# Patient Record
Sex: FEMALE | Race: WHITE | Employment: FULL TIME | URBAN - NONMETROPOLITAN AREA
[De-identification: names, ages, dates, MRNs, and addresses within clinical notes are randomized per-mention and may not be internally consistent; named-entity substitution may affect disease eponyms.]

---

## 2017-07-26 ENCOUNTER — HOSPITAL ENCOUNTER (OUTPATIENT)
Dept: GENERAL RADIOLOGY | Age: 57
Discharge: HOME | End: 2017-07-26
Payer: COMMERCIAL

## 2017-07-26 ENCOUNTER — HOSPITAL ENCOUNTER (OUTPATIENT)
Age: 57
Discharge: HOME | End: 2017-07-26
Payer: COMMERCIAL

## 2017-07-26 DIAGNOSIS — R05.9 COUGH: ICD-10-CM

## 2017-07-26 PROCEDURE — 71020 XR CHEST STANDARD TWO VW: CPT

## 2017-12-03 ENCOUNTER — HOSPITAL ENCOUNTER (OUTPATIENT)
Age: 57
Setting detail: OBSERVATION
LOS: 1 days | Discharge: HOME | End: 2017-12-04
Attending: EMERGENCY MEDICINE | Admitting: FAMILY MEDICINE
Payer: COMMERCIAL

## 2017-12-03 ENCOUNTER — APPOINTMENT (OUTPATIENT)
Dept: GENERAL RADIOLOGY | Age: 57
End: 2017-12-03
Payer: COMMERCIAL

## 2017-12-03 DIAGNOSIS — I20.8 STABLE ANGINA PECTORIS (HCC): Primary | ICD-10-CM

## 2017-12-03 DIAGNOSIS — J40 BRONCHITIS: ICD-10-CM

## 2017-12-03 PROBLEM — R07.9 CHEST PAIN: Status: ACTIVE | Noted: 2017-12-03

## 2017-12-03 PROBLEM — J45.21 MILD INTERMITTENT ASTHMA WITH ACUTE EXACERBATION: Chronic | Status: ACTIVE | Noted: 2017-12-03

## 2017-12-03 LAB
ABSOLUTE EOS #: 0.2 K/UL (ref 0–0.4)
ABSOLUTE IMMATURE GRANULOCYTE: ABNORMAL K/UL (ref 0–0.3)
ABSOLUTE LYMPH #: 2.4 K/UL (ref 1–4.8)
ABSOLUTE MONO #: 0.7 K/UL (ref 0–1)
ANION GAP SERPL CALCULATED.3IONS-SCNC: 12 MMOL/L (ref 9–17)
BASOPHILS # BLD: 1 % (ref 0–2)
BASOPHILS ABSOLUTE: 0.1 K/UL (ref 0–0.2)
BNP INTERPRETATION: NORMAL
BUN BLDV-MCNC: 16 MG/DL (ref 6–20)
BUN/CREAT BLD: 20 (ref 9–20)
CALCIUM SERPL-MCNC: 9.2 MG/DL (ref 8.6–10.4)
CHLORIDE BLD-SCNC: 100 MMOL/L (ref 98–107)
CO2: 29 MMOL/L (ref 20–31)
CREAT SERPL-MCNC: 0.8 MG/DL (ref 0.5–0.9)
D-DIMER QUANTITATIVE: 0.25 MG/L FEU (ref 0.19–0.5)
DIFFERENTIAL TYPE: ABNORMAL
EKG ATRIAL RATE: 100 BPM
EKG ATRIAL RATE: 107 BPM
EKG ATRIAL RATE: 77 BPM
EKG ATRIAL RATE: 86 BPM
EKG P AXIS: 40 DEGREES
EKG P AXIS: 49 DEGREES
EKG P AXIS: 53 DEGREES
EKG P AXIS: 58 DEGREES
EKG P-R INTERVAL: 148 MS
EKG P-R INTERVAL: 150 MS
EKG Q-T INTERVAL: 340 MS
EKG Q-T INTERVAL: 342 MS
EKG Q-T INTERVAL: 364 MS
EKG Q-T INTERVAL: 394 MS
EKG QRS DURATION: 72 MS
EKG QRS DURATION: 74 MS
EKG QTC CALCULATION (BAZETT): 435 MS
EKG QTC CALCULATION (BAZETT): 441 MS
EKG QTC CALCULATION (BAZETT): 445 MS
EKG QTC CALCULATION (BAZETT): 453 MS
EKG R AXIS: -3 DEGREES
EKG R AXIS: 23 DEGREES
EKG R AXIS: 37 DEGREES
EKG R AXIS: 41 DEGREES
EKG T AXIS: 43 DEGREES
EKG T AXIS: 57 DEGREES
EKG T AXIS: 61 DEGREES
EKG T AXIS: 61 DEGREES
EKG VENTRICULAR RATE: 100 BPM
EKG VENTRICULAR RATE: 107 BPM
EKG VENTRICULAR RATE: 77 BPM
EKG VENTRICULAR RATE: 86 BPM
EOSINOPHILS RELATIVE PERCENT: 3 % (ref 0–8)
GFR AFRICAN AMERICAN: >60 ML/MIN
GFR NON-AFRICAN AMERICAN: >60 ML/MIN
GFR SERPL CREATININE-BSD FRML MDRD: NORMAL ML/MIN/{1.73_M2}
GFR SERPL CREATININE-BSD FRML MDRD: NORMAL ML/MIN/{1.73_M2}
GLUCOSE BLD-MCNC: 95 MG/DL (ref 70–99)
HCT VFR BLD CALC: 37.8 % (ref 36–46)
HEMOGLOBIN: 12.2 G/DL (ref 12–16)
IMMATURE GRANULOCYTES: ABNORMAL %
INR BLD: 1 (ref 0.9–1.2)
LYMPHOCYTES # BLD: 32 % (ref 24–44)
MCH RBC QN AUTO: 26.4 PG (ref 26–34)
MCHC RBC AUTO-ENTMCNC: 32.3 G/DL (ref 31–37)
MCV RBC AUTO: 81.7 FL (ref 80–100)
MONOCYTES # BLD: 10 % (ref 0–12)
PARTIAL THROMBOPLASTIN TIME: 24.3 SEC (ref 23.2–34.4)
PDW BLD-RTO: 16 % (ref 12.1–15.2)
PLATELET # BLD: 351 K/UL (ref 140–450)
PLATELET ESTIMATE: ABNORMAL
PMV BLD AUTO: 6.7 FL (ref 6–12)
POTASSIUM SERPL-SCNC: 3.8 MMOL/L (ref 3.7–5.3)
PRO-BNP: 30 PG/ML
PROTHROMBIN TIME: 10.6 SEC (ref 9.7–12.2)
RBC # BLD: 4.62 M/UL (ref 4–5.2)
RBC # BLD: ABNORMAL 10*6/UL
SEG NEUTROPHILS: 54 % (ref 36–66)
SEGMENTED NEUTROPHILS ABSOLUTE COUNT: 4.1 K/UL (ref 1.8–7.7)
SODIUM BLD-SCNC: 141 MMOL/L (ref 135–144)
TROPONIN INTERP: NORMAL
TROPONIN INTERP: NORMAL
TROPONIN T: <0.03 NG/ML
TROPONIN T: <0.03 NG/ML
WBC # BLD: 7.4 K/UL (ref 3.5–11)
WBC # BLD: ABNORMAL 10*3/UL

## 2017-12-03 PROCEDURE — 94664 DEMO&/EVAL PT USE INHALER: CPT

## 2017-12-03 PROCEDURE — 80048 BASIC METABOLIC PNL TOTAL CA: CPT

## 2017-12-03 PROCEDURE — 1200000000 HC SEMI PRIVATE

## 2017-12-03 PROCEDURE — 84484 ASSAY OF TROPONIN QUANT: CPT

## 2017-12-03 PROCEDURE — 6370000000 HC RX 637 (ALT 250 FOR IP): Performed by: FAMILY MEDICINE

## 2017-12-03 PROCEDURE — 6370000000 HC RX 637 (ALT 250 FOR IP): Performed by: EMERGENCY MEDICINE

## 2017-12-03 PROCEDURE — 85610 PROTHROMBIN TIME: CPT

## 2017-12-03 PROCEDURE — 71010 XR CHEST PORTABLE: CPT

## 2017-12-03 PROCEDURE — 83880 ASSAY OF NATRIURETIC PEPTIDE: CPT

## 2017-12-03 PROCEDURE — 85379 FIBRIN DEGRADATION QUANT: CPT

## 2017-12-03 PROCEDURE — 96372 THER/PROPH/DIAG INJ SC/IM: CPT

## 2017-12-03 PROCEDURE — 2580000003 HC RX 258: Performed by: FAMILY MEDICINE

## 2017-12-03 PROCEDURE — 93005 ELECTROCARDIOGRAM TRACING: CPT

## 2017-12-03 PROCEDURE — 96374 THER/PROPH/DIAG INJ IV PUSH: CPT

## 2017-12-03 PROCEDURE — 6360000002 HC RX W HCPCS: Performed by: FAMILY MEDICINE

## 2017-12-03 PROCEDURE — 6360000002 HC RX W HCPCS: Performed by: EMERGENCY MEDICINE

## 2017-12-03 PROCEDURE — G0378 HOSPITAL OBSERVATION PER HR: HCPCS

## 2017-12-03 PROCEDURE — 85730 THROMBOPLASTIN TIME PARTIAL: CPT

## 2017-12-03 PROCEDURE — 99285 EMERGENCY DEPT VISIT HI MDM: CPT

## 2017-12-03 PROCEDURE — 85025 COMPLETE CBC W/AUTO DIFF WBC: CPT

## 2017-12-03 RX ORDER — SODIUM CHLORIDE 0.9 % (FLUSH) 0.9 %
10 SYRINGE (ML) INJECTION PRN
Status: DISCONTINUED | OUTPATIENT
Start: 2017-12-03 | End: 2017-12-04 | Stop reason: HOSPADM

## 2017-12-03 RX ORDER — AZITHROMYCIN 250 MG/1
250 TABLET, FILM COATED ORAL DAILY
COMMUNITY
End: 2019-05-02

## 2017-12-03 RX ORDER — ASPIRIN 81 MG/1
81 TABLET, CHEWABLE ORAL DAILY
Status: DISCONTINUED | OUTPATIENT
Start: 2017-12-03 | End: 2017-12-04 | Stop reason: HOSPADM

## 2017-12-03 RX ORDER — ACETAMINOPHEN 500 MG
1000 TABLET ORAL ONCE
Status: COMPLETED | OUTPATIENT
Start: 2017-12-03 | End: 2017-12-03

## 2017-12-03 RX ORDER — SODIUM CHLORIDE 0.9 % (FLUSH) 0.9 %
10 SYRINGE (ML) INJECTION EVERY 12 HOURS SCHEDULED
Status: DISCONTINUED | OUTPATIENT
Start: 2017-12-03 | End: 2017-12-04 | Stop reason: HOSPADM

## 2017-12-03 RX ORDER — ASPIRIN 81 MG/1
324 TABLET, CHEWABLE ORAL ONCE
Status: DISCONTINUED | OUTPATIENT
Start: 2017-12-03 | End: 2017-12-04 | Stop reason: HOSPADM

## 2017-12-03 RX ORDER — ONDANSETRON 2 MG/ML
4 INJECTION INTRAMUSCULAR; INTRAVENOUS EVERY 6 HOURS PRN
Status: DISCONTINUED | OUTPATIENT
Start: 2017-12-03 | End: 2017-12-04 | Stop reason: HOSPADM

## 2017-12-03 RX ORDER — CHLORAL HYDRATE 500 MG
2000 CAPSULE ORAL DAILY
COMMUNITY

## 2017-12-03 RX ORDER — PRAVASTATIN SODIUM 20 MG
10 TABLET ORAL NIGHTLY
Status: DISCONTINUED | OUTPATIENT
Start: 2017-12-03 | End: 2017-12-04 | Stop reason: HOSPADM

## 2017-12-03 RX ORDER — LEVOTHYROXINE SODIUM 0.1 MG/1
200 TABLET ORAL DAILY
Status: DISCONTINUED | OUTPATIENT
Start: 2017-12-03 | End: 2017-12-04 | Stop reason: HOSPADM

## 2017-12-03 RX ORDER — ACETAMINOPHEN 325 MG/1
650 TABLET ORAL EVERY 4 HOURS PRN
Status: DISCONTINUED | OUTPATIENT
Start: 2017-12-03 | End: 2017-12-04 | Stop reason: HOSPADM

## 2017-12-03 RX ORDER — AZITHROMYCIN 250 MG/1
250 TABLET, FILM COATED ORAL DAILY
Status: DISCONTINUED | OUTPATIENT
Start: 2017-12-03 | End: 2017-12-04 | Stop reason: HOSPADM

## 2017-12-03 RX ORDER — ASPIRIN 81 MG/1
324 TABLET, CHEWABLE ORAL ONCE
Status: COMPLETED | OUTPATIENT
Start: 2017-12-03 | End: 2017-12-03

## 2017-12-03 RX ORDER — ALBUTEROL SULFATE 2.5 MG/3ML
2.5 SOLUTION RESPIRATORY (INHALATION) ONCE
Status: COMPLETED | OUTPATIENT
Start: 2017-12-03 | End: 2017-12-03

## 2017-12-03 RX ORDER — ALPRAZOLAM 0.25 MG/1
0.25 TABLET ORAL EVERY 8 HOURS PRN
Status: DISCONTINUED | OUTPATIENT
Start: 2017-12-03 | End: 2017-12-04 | Stop reason: HOSPADM

## 2017-12-03 RX ORDER — METHYLPREDNISOLONE SODIUM SUCCINATE 125 MG/2ML
125 INJECTION, POWDER, LYOPHILIZED, FOR SOLUTION INTRAMUSCULAR; INTRAVENOUS ONCE
Status: COMPLETED | OUTPATIENT
Start: 2017-12-03 | End: 2017-12-03

## 2017-12-03 RX ORDER — NITROGLYCERIN 0.4 MG/1
0.4 TABLET SUBLINGUAL EVERY 5 MIN PRN
Status: COMPLETED | OUTPATIENT
Start: 2017-12-03 | End: 2017-12-03

## 2017-12-03 RX ADMIN — ENOXAPARIN SODIUM 40 MG: 40 INJECTION SUBCUTANEOUS at 19:57

## 2017-12-03 RX ADMIN — ACETAMINOPHEN 1000 MG: 500 TABLET ORAL at 12:41

## 2017-12-03 RX ADMIN — NITROGLYCERIN 0.4 MG: 0.4 TABLET SUBLINGUAL at 15:06

## 2017-12-03 RX ADMIN — METHYLPREDNISOLONE SODIUM SUCCINATE 125 MG: 125 INJECTION, POWDER, FOR SOLUTION INTRAMUSCULAR; INTRAVENOUS at 12:20

## 2017-12-03 RX ADMIN — ASPIRIN 81 MG 324 MG: 81 TABLET ORAL at 12:20

## 2017-12-03 RX ADMIN — METOPROLOL TARTRATE 25 MG: 25 TABLET ORAL at 19:58

## 2017-12-03 RX ADMIN — AZITHROMYCIN 250 MG: 250 TABLET, FILM COATED ORAL at 19:57

## 2017-12-03 RX ADMIN — Medication 10 ML: at 19:58

## 2017-12-03 RX ADMIN — ALBUTEROL SULFATE 2.5 MG: 2.5 SOLUTION RESPIRATORY (INHALATION) at 12:33

## 2017-12-03 RX ADMIN — NITROGLYCERIN 0.4 MG: 0.4 TABLET SUBLINGUAL at 12:20

## 2017-12-03 RX ADMIN — NITROGLYCERIN 0.4 MG: 0.4 TABLET SUBLINGUAL at 12:24

## 2017-12-03 RX ADMIN — TICAGRELOR 180 MG: 90 TABLET ORAL at 12:24

## 2017-12-03 ASSESSMENT — ENCOUNTER SYMPTOMS
COLOR CHANGE: 0
WHEEZING: 0
CONSTIPATION: 0
SORE THROAT: 0
ABDOMINAL PAIN: 0
BACK PAIN: 0
ABDOMINAL DISTENTION: 0
TROUBLE SWALLOWING: 0
EYE DISCHARGE: 0
SHORTNESS OF BREATH: 1
SINUS PRESSURE: 0
COUGH: 1

## 2017-12-03 ASSESSMENT — PAIN SCALES - GENERAL
PAINLEVEL_OUTOF10: 0
PAINLEVEL_OUTOF10: 2
PAINLEVEL_OUTOF10: 5
PAINLEVEL_OUTOF10: 0

## 2017-12-03 NOTE — ED PROVIDER NOTES
HPI  the patient is a 80-year-old female, who presents with chest pain. She has been treated for a bronchitis for the last 5 days. She has had a dry hacking cough. She also had some wheezing. She does not have asthma and has not smoked for at least 32 years. Today at about 9:30 this morning, she developed lower sternal chest pain. Coughing makes the pain worse. When she is resting the pain is a level II but with coughing goes up to a level VI. However, the pain never goes completely away. The pain does not radiate into her back, neck, shoulder or arms. Patient has no history of hypertension or diabetes. She is treated for hyperlipidemia. There is a family history of heart disease. She does not take a daily aspirin. Review of Systems   Constitutional: Positive for activity change and fatigue. Negative for chills, diaphoresis and fever. HENT: Positive for congestion. Negative for ear pain, sinus pressure, sore throat and trouble swallowing. Eyes: Negative for discharge and visual disturbance. Respiratory: Positive for cough and shortness of breath. Negative for wheezing. Cardiovascular: Positive for chest pain. Negative for palpitations and leg swelling. Gastrointestinal: Negative for abdominal distention, abdominal pain and constipation. Endocrine: Negative for cold intolerance and heat intolerance. Genitourinary: Negative for difficulty urinating, dysuria, flank pain and frequency. Musculoskeletal: Negative for back pain. Skin: Negative for color change, pallor and rash. Neurological: Negative for dizziness and headaches. Psychiatric/Behavioral: Negative for confusion, decreased concentration and dysphoric mood. Physical Exam   Constitutional: She is oriented to person, place, and time. She appears well-developed and well-nourished. No distress. HENT:   Head: Normocephalic and atraumatic.    Right Ear: External ear normal.   Left Ear: External ear normal.   Eyes: Conjunctivae and EOM are normal. Pupils are equal, round, and reactive to light. Neck: Normal range of motion. Neck supple. Cardiovascular: Normal rate, regular rhythm, normal heart sounds and intact distal pulses. Exam reveals no gallop. No murmur heard. Pulmonary/Chest: Effort normal and breath sounds normal. No respiratory distress. She has no wheezes. She has no rales. She exhibits tenderness. Lower sternal tenderness with palpation. Abdominal: Soft. Bowel sounds are normal. She exhibits no distension. There is no tenderness. There is no rebound and no guarding. Musculoskeletal: Normal range of motion. She exhibits no edema or tenderness. Neurological: She is alert and oriented to person, place, and time. No cranial nerve deficit. She exhibits normal muscle tone. Coordination normal.   Skin: Skin is warm and dry. She is not diaphoretic. Psychiatric: She has a normal mood and affect. Her behavior is normal. Judgment and thought content normal.     Nursing Notes were reviewed. Past Medical History:   Diagnosis Date    Thyroid disease        History reviewed. No pertinent family history. Past Surgical History:   Procedure Laterality Date    COLONOSCOPY  2013    FRACTURE SURGERY Right 1988    [facility]    HYSTEROSCOPY         Social History     Social History    Marital status:      Spouse name: N/A    Number of children: N/A    Years of education: N/A     Social History Main Topics    Smoking status: Former Smoker     Years: 27.00    Smokeless tobacco: Never Used    Alcohol use Yes      Comment: social    Drug use: Unknown    Sexual activity: Not Asked     Other Topics Concern    None     Social History Narrative    None         Procedures    MDM  patient had been pain-free after 2 nitroglycerin. She developed some chest pain at approximately 1500. She was given sublingual nitroglycerin and she became tachycardic with a heart rate of 107.   I believe the tachycardia is

## 2017-12-03 NOTE — H&P
Hospital Medicine  History and Physical    Patient:  Ke Parham  MRN: [id number]    Chief Complaint:  Chest pain    History Obtained From:  patient  PCP: Marii Ruelas DO    History of Present Illness: The patient is a 62 y.o. female who presents with substernal and epigastric chest pain described as pressure since early this morning while shopping for groceries  She states it did not get better after she went home and took a xanax  She came to ER and has gotten relief with sl nitro  Her recent history includes an episode of bronchitis for which shehas used albuterol rescue inhaler pennie a short course of prednisone last week which helped temporarily   She then had been given zithromax and has not improved  No fever no productive cough  No past cardiac history  She exercises very aggressively with cardiotone and drumming and has not had any drop off or exertional symptoms  Her family history is strong for ASHD in father and grandmother both developing disease in 62s  She smoked for less than 5 years and quit over 30 yrs ago      Past Medical History:        Diagnosis Date    Thyroid disease        Past Surgical History:        Procedure Laterality Date    COLONOSCOPY  2013    FRACTURE SURGERY Right 1988    [facility]    HYSTEROSCOPY         Medications Prior to Admission:    Prior to Admission medications    Medication Sig Start Date End Date Taking? Authorizing Provider   azithromycin (ZITHROMAX) 250 MG tablet Take 250 mg by mouth daily   Yes Historical Provider, MD   Biotin 5000 MCG CAPS Take 1 capsule by mouth daily   Yes Historical Provider, MD   Omega-3 Fatty Acids (FISH OIL) 1000 MG CAPS Take 2,000 mg by mouth daily   Yes Historical Provider, MD   levothyroxine (SYNTHROID) 200 MCG tablet Take 150 mcg by mouth Daily    Yes Historical Provider, MD   ALPRAZolam (XANAX) 0.25 MG tablet Take 0.25 mg by mouth every 8 hours as needed.    Yes Historical Provider, MD   pravastatin (PRAVACHOL) 10 MG tablet Take 10 mg by mouth daily. Yes Historical Provider, MD   aspirin 81 MG tablet Take 81 mg by mouth daily. Yes Historical Provider, MD       Allergies:  Review of patient's allergies indicates no known allergies. Social History:   TOBACCO:   reports that she has quit smoking. She quit after 27.00 years of use. She has never used smokeless tobacco.  ETOH:   reports that she drinks alcohol. Works at [employer] and has exposure to dust     Family History:   History reviewed. No pertinent family history. Review of Systems:    Constitutional: negative for fevers and malaise  Eyes: negative for visual disturbance  Ears, nose, mouth, throat, and face: negative for hoarseness and nasal congestion  Respiratory: positive for cough, negative for hemoptysis, shortness of breath and sputum  Cardiovascular: positive for chest pain, negative for exertional chest pressure/discomfort, fatigue, irregular heart beat, lower extremity edema, near-syncope and palpitations  Gastrointestinal: negative for abdominal pain, dysphagia, melena, nausea and reflux symptoms  Genitourinary:negative for hematuria  Integument/breast: negative  Hematologic/lymphatic: negative  Musculoskeletal:negative  Neurological: negative  Behavioral/Psych: negative  Endocrine: negative  Allergic/Immunologic: negative        Objective:    Vitals:   Vitals:    12/03/17 1730   BP: 126/86   Pulse: 95   Resp: 18   Temp: 98.7 °F (37.1 °C)   SpO2: 97%     Weight: 142 lb 6 oz (64.6 kg)   Height: 5' 5\" (165.1 cm)   -----------------------------------------------------------------    Exam:  GEN:    Awake, alert and oriented x 3. no acute distress. Well developed / well nourished. EYES:  EOMI, pupils equal   ENT:  Neck supple. No lymphadenopathy. No carotid bruit  CVS:    RRR, no murmur, rub or gallop  PULM: CTA, no wheezes, rales or rhonchi  ABD:    Bowels sounds normal.  Abdomen is soft. No distention. No tenderness. EXT:   no edema bilaterally .   No calf tenderness. NEURO: Motor and sensory are intact  SKIN:  No rashes. No skin lesions.     PSYCH:  Normal mood and affect  -----------------------------------------------------------------  Diagnostic Data:   · All diagnostic data was reviewed    Assessment:         Principal Problem:    Chest pain  Active Problems:    Bronchitis    Mild intermittent asthma with acute exacerbation      Plan:     · This patient requires overnight observation because of chest pain    · Factors affecting the medical complexity of this patient include risk factors which place her in a moderate category   · Estimated length of stay is 1 days  · We will get echo and stress in am with cardiology consult    Alfa Morales MD

## 2017-12-04 ENCOUNTER — APPOINTMENT (OUTPATIENT)
Dept: NON INVASIVE DIAGNOSTICS | Age: 57
End: 2017-12-04
Payer: COMMERCIAL

## 2017-12-04 VITALS
HEIGHT: 65 IN | SYSTOLIC BLOOD PRESSURE: 114 MMHG | HEART RATE: 68 BPM | OXYGEN SATURATION: 97 % | TEMPERATURE: 98.6 F | RESPIRATION RATE: 16 BRPM | WEIGHT: 141.8 LBS | BODY MASS INDEX: 23.63 KG/M2 | DIASTOLIC BLOOD PRESSURE: 79 MMHG

## 2017-12-04 DIAGNOSIS — R94.39 ABNORMAL CARDIOVASCULAR STRESS TEST: Primary | ICD-10-CM

## 2017-12-04 LAB
ABSOLUTE EOS #: 0.1 K/UL (ref 0–0.4)
ABSOLUTE IMMATURE GRANULOCYTE: ABNORMAL K/UL (ref 0–0.3)
ABSOLUTE LYMPH #: 1.4 K/UL (ref 1–4.8)
ABSOLUTE MONO #: 0.9 K/UL (ref 0–1)
ANION GAP SERPL CALCULATED.3IONS-SCNC: 12 MMOL/L (ref 9–17)
BASOPHILS # BLD: 0 % (ref 0–2)
BASOPHILS ABSOLUTE: 0 K/UL (ref 0–0.2)
BUN BLDV-MCNC: 13 MG/DL (ref 6–20)
BUN/CREAT BLD: 24 (ref 9–20)
CALCIUM SERPL-MCNC: 9.2 MG/DL (ref 8.6–10.4)
CHLORIDE BLD-SCNC: 104 MMOL/L (ref 98–107)
CHOLESTEROL/HDL RATIO: 2.9
CHOLESTEROL: 180 MG/DL
CO2: 24 MMOL/L (ref 20–31)
CREAT SERPL-MCNC: 0.55 MG/DL (ref 0.5–0.9)
DIFFERENTIAL TYPE: ABNORMAL
EOSINOPHILS RELATIVE PERCENT: 1 % (ref 0–8)
GFR AFRICAN AMERICAN: >60 ML/MIN
GFR NON-AFRICAN AMERICAN: >60 ML/MIN
GFR SERPL CREATININE-BSD FRML MDRD: ABNORMAL ML/MIN/{1.73_M2}
GFR SERPL CREATININE-BSD FRML MDRD: ABNORMAL ML/MIN/{1.73_M2}
GLUCOSE BLD-MCNC: 104 MG/DL (ref 70–99)
HCT VFR BLD CALC: 36.4 % (ref 36–46)
HDLC SERPL-MCNC: 62 MG/DL
HEMOGLOBIN: 11.6 G/DL (ref 12–16)
IMMATURE GRANULOCYTES: ABNORMAL %
LDL CHOLESTEROL: 96 MG/DL (ref 0–130)
LV EF: 65 %
LVEF MODALITY: NORMAL
LYMPHOCYTES # BLD: 13 % (ref 24–44)
MCH RBC QN AUTO: 26.1 PG (ref 26–34)
MCHC RBC AUTO-ENTMCNC: 31.9 G/DL (ref 31–37)
MCV RBC AUTO: 81.8 FL (ref 80–100)
MONOCYTES # BLD: 8 % (ref 0–12)
PDW BLD-RTO: 16.2 % (ref 12.1–15.2)
PLATELET # BLD: 340 K/UL (ref 140–450)
PLATELET ESTIMATE: ABNORMAL
PMV BLD AUTO: 7.1 FL (ref 6–12)
POTASSIUM SERPL-SCNC: 4.2 MMOL/L (ref 3.7–5.3)
RBC # BLD: 4.45 M/UL (ref 4–5.2)
RBC # BLD: ABNORMAL 10*6/UL
SEG NEUTROPHILS: 78 % (ref 36–66)
SEGMENTED NEUTROPHILS ABSOLUTE COUNT: 8.8 K/UL (ref 1.8–7.7)
SODIUM BLD-SCNC: 140 MMOL/L (ref 135–144)
TRIGL SERPL-MCNC: 110 MG/DL
VLDLC SERPL CALC-MCNC: NORMAL MG/DL (ref 1–30)
WBC # BLD: 11.3 K/UL (ref 3.5–11)
WBC # BLD: ABNORMAL 10*3/UL

## 2017-12-04 PROCEDURE — 94664 DEMO&/EVAL PT USE INHALER: CPT

## 2017-12-04 PROCEDURE — 2580000003 HC RX 258: Performed by: FAMILY MEDICINE

## 2017-12-04 PROCEDURE — G0378 HOSPITAL OBSERVATION PER HR: HCPCS

## 2017-12-04 PROCEDURE — 99244 OFF/OP CNSLTJ NEW/EST MOD 40: CPT | Performed by: FAMILY MEDICINE

## 2017-12-04 PROCEDURE — 78452 HT MUSCLE IMAGE SPECT MULT: CPT

## 2017-12-04 PROCEDURE — A9500 TC99M SESTAMIBI: HCPCS | Performed by: FAMILY MEDICINE

## 2017-12-04 PROCEDURE — 96372 THER/PROPH/DIAG INJ SC/IM: CPT

## 2017-12-04 PROCEDURE — 3430000000 HC RX DIAGNOSTIC RADIOPHARMACEUTICAL: Performed by: FAMILY MEDICINE

## 2017-12-04 PROCEDURE — 6370000000 HC RX 637 (ALT 250 FOR IP): Performed by: FAMILY MEDICINE

## 2017-12-04 PROCEDURE — 93017 CV STRESS TEST TRACING ONLY: CPT

## 2017-12-04 PROCEDURE — 80048 BASIC METABOLIC PNL TOTAL CA: CPT

## 2017-12-04 PROCEDURE — 93005 ELECTROCARDIOGRAM TRACING: CPT

## 2017-12-04 PROCEDURE — 80061 LIPID PANEL: CPT

## 2017-12-04 PROCEDURE — 93306 TTE W/DOPPLER COMPLETE: CPT

## 2017-12-04 PROCEDURE — 94640 AIRWAY INHALATION TREATMENT: CPT

## 2017-12-04 PROCEDURE — 85025 COMPLETE CBC W/AUTO DIFF WBC: CPT

## 2017-12-04 PROCEDURE — 6360000002 HC RX W HCPCS: Performed by: FAMILY MEDICINE

## 2017-12-04 PROCEDURE — 36415 COLL VENOUS BLD VENIPUNCTURE: CPT

## 2017-12-04 RX ORDER — METOPROLOL SUCCINATE 25 MG/1
25 TABLET, EXTENDED RELEASE ORAL DAILY
Qty: 30 TABLET | Refills: 0 | Status: SHIPPED | OUTPATIENT
Start: 2017-12-04 | End: 2017-12-07 | Stop reason: HOSPADM

## 2017-12-04 RX ORDER — ALBUTEROL SULFATE 2.5 MG/3ML
2.5 SOLUTION RESPIRATORY (INHALATION) ONCE
Status: COMPLETED | OUTPATIENT
Start: 2017-12-04 | End: 2017-12-04

## 2017-12-04 RX ADMIN — MOMETASONE FUROATE AND FORMOTEROL FUMARATE DIHYDRATE 2 PUFF: 200; 5 AEROSOL RESPIRATORY (INHALATION) at 08:21

## 2017-12-04 RX ADMIN — ENOXAPARIN SODIUM 40 MG: 40 INJECTION SUBCUTANEOUS at 08:09

## 2017-12-04 RX ADMIN — LEVOTHYROXINE SODIUM 200 MCG: 100 TABLET ORAL at 09:05

## 2017-12-04 RX ADMIN — ASPIRIN 81 MG 81 MG: 81 TABLET ORAL at 08:09

## 2017-12-04 RX ADMIN — AZITHROMYCIN 250 MG: 250 TABLET, FILM COATED ORAL at 08:09

## 2017-12-04 RX ADMIN — Medication 32.3 MILLICURIE: at 10:04

## 2017-12-04 RX ADMIN — METOPROLOL TARTRATE 25 MG: 25 TABLET ORAL at 08:09

## 2017-12-04 RX ADMIN — ALBUTEROL SULFATE 2.5 MG: 2.5 SOLUTION RESPIRATORY (INHALATION) at 08:21

## 2017-12-04 RX ADMIN — Medication 10 ML: at 08:10

## 2017-12-04 RX ADMIN — Medication 11.9 MILLICURIE: at 08:18

## 2017-12-04 RX ADMIN — ACETAMINOPHEN 650 MG: 325 TABLET, FILM COATED ORAL at 08:09

## 2017-12-04 ASSESSMENT — PAIN SCALES - GENERAL
PAINLEVEL_OUTOF10: 0
PAINLEVEL_OUTOF10: 0
PAINLEVEL_OUTOF10: 2
PAINLEVEL_OUTOF10: 0
PAINLEVEL_OUTOF10: 0
PAINLEVEL_OUTOF10: 2
PAINLEVEL_OUTOF10: 0

## 2017-12-04 ASSESSMENT — PAIN DESCRIPTION - PAIN TYPE
TYPE: ACUTE PAIN
TYPE: ACUTE PAIN

## 2017-12-04 ASSESSMENT — PAIN DESCRIPTION - LOCATION
LOCATION: HEAD
LOCATION: HEAD

## 2017-12-04 ASSESSMENT — PULMONARY FUNCTION TESTS: PEFR_L/MIN: 440

## 2017-12-04 NOTE — PROGRESS NOTES
Per Dr. Edward Szymanski: Pt allowed to have toast and water this am d/t Stress test likely to be delayed until early afternoon. Pt aware, dietary informed.

## 2017-12-04 NOTE — PROGRESS NOTES
[]  Current smoker or quit w/ in 12 mos []  Pulm. History and, or 20 pk/yr smoking hx []  Admitted w/ acute pulm. dx and, or has been admitted w/ pulm. dx 2 or more times over past 12 mos 0   Surgical History this Admit  (SURG HX) [x]  No surgery []  General surgery []  Lower abdominal []  Thoracic or upper abdominal   []  Thoracic w/ pulm. disease 0   Chest X-Ray (CXR)/CT Scan [x]  Clear or not applicable []  Not available []  Atelect- asis or pleural effusions []  Localized infiltrate or pulm. edema []  Con-solidated Infiltrates, bilateral, or in more than 1 lobe 0   Slow or Forced VC, FEV1 OR PEFR (PULM FXN)  [x]  80% or greater, or not indicated []  Pt. unable to perform []  FEV1 or PEFR or VC 51-79%. []  FEV1 or PEFR or VC  30-49%   []  FEV1 or PEFR or VC less than 30%   0   TOTAL ACUITY: 0       CARE PLAN    If Acuity Level is 2, 3, or 4 in any of the following:    [] BILATERAL BREATH SOUNDS (BBS)     [] PULMONARY HISTORY (PULM HX)  [] PULMONARY FUNCTION (PULM FX)    Goal: Improve respiratory functions in patients with airway disease and decrease WOB    [] AEROSOL PROTOCOL    Total Acuity:   16-32  []  Secondary Assessment in 24 hrs Total Acuity:  9-15  []  Secondary Assessment in 24 hrs Total Acuity:  4-8  []  Secondary Assessment in 48 hrs Total Acuity:  0-3  []  Secondary Assessment in 72 hrs   HHN AEROSOL THERAPY with  [physician-ordered bronchodilator(s)] q 4 & Albuterol PRN q2 hrs. Breath-Actuated Neb if BBS Acuity = 4, and pt. can use MP. Notify physician if condition deteriorates. HHN AEROSOL THERAPY with  [physician-ordered bronchodilator(s)]  QID and Albuterol PRN q4 hrs. Breath-Actuated Neb if BBS Acuity = 4, and pt. can use MP. Notify physician if condition deteriorates. MDI THERAPY with  2 actuations of [physician-ordered bronchodilator(s)] via spacer TID Albuterol and PRNq4 hrs. If unable to utilize MDI: HHN [physician-ordered bronchodilator(s)] TID and Albuterol PRN q4 hrs.    Notify physician if condition deteriorates. MDI THERAPY with  [physician-ordered bronchodilator(s)] via spacer TID PRN. If unable to utilize MDI: HHN [physician-ordered bronchodilator(s)] TID PRN. Notify physician if condition deteriorates. If Acuity Level is 2, 3, or 4 in any of the following:    [] COUGH     [] SURGICAL HISTORY (SURG HX)  [] CHEST XRAY (CXR)    Goal: Improvement in sputum mobilization in patients with ineffective airway clearance. Reverse atelectasis. [] Bronchopulmonary Hygiene Protocol    Total Acuity:   16-32  []  Secondary Assessment in 24 hrs Total Acuity:  9-15  []  Secondary Assessment in 24 hrs Total Acuity:  4-8  []  Secondary Assessment in 48 hrs Total Acuity:  0-3  []  Secondary Assessment in 72 hrs   METANEB QID with [physician-ordered bronchodilator(s)] if CXR Acuity = 4; otherwise:  PD&P, PEP, or Vest QID & PRN  NT Sxn PRN for ineffective cough  METANEB QID with [physician-ordered bronchodilator(s)] if CXR Acuity = 4; otherwise:  PD&P, PEP, or Vest TID & PRN  NT Sxn PRN for ineffective cough  Instruct patient to self-perform IS q1hr WA   Directed Cough self-performed q1hr WA     If Acuity Level is 2 or above in the following:    [] PULMONARY HISTORY (PULM HX)    Goal: Assist patient in quitting smoking to slow or stop the progression of lung disease.     [] Smoking Cessation Protocol    SMOKING CESSATION EDUCATION provided according to policy NO_222: (jose with an X)  ____Yes    ____ No     ____ NA    Smoking Cessation Booklet given:  ____Yes  ____No ____Patient Migel Guardado

## 2017-12-04 NOTE — PROGRESS NOTES
Discharge instructions given to pt and . No questions. Pt aware of follow up appointments as listed on AVS. Pt d/c'd off unit at this time, ambulatory with spouse to home. Belongings in hand. No issues noted.

## 2017-12-04 NOTE — CONSULTS
Take 150 mcg by mouth Daily       ALPRAZolam (XANAX) 0.25 MG tablet Take 0.25 mg by mouth every 8 hours as needed.  pravastatin (PRAVACHOL) 10 MG tablet Take 10 mg by mouth daily.  aspirin 81 MG tablet Take 81 mg by mouth daily. FAMILY HISTORY: family history is not on file. PHYSICAL EXAM:   /69   Pulse 62   Temp 98.6 °F (37 °C) (Tympanic)   Resp 18   Ht 5' 5\" (1.651 m)   Wt 141 lb 12.8 oz (64.3 kg)   SpO2 94%   BMI 23.60 kg/m²  Body mass index is 23.6 kg/m². Constitutional: She is oriented to person, place, and time. She appears well-developed and well-nourished. In no acute distress. HEENT: Normocephalic and atraumatic. No JVD present. Carotid bruit is not present. No mass and no thyromegaly present. No lymphadenopathy present. Cardiovascular: Normal rate, regular rhythm, normal heart sounds and intact distal pulses. Exam reveals no gallop and no friction rub. A II/VI systolic murmur was heard at the base of the heart without significant radiation. Pulmonary/Chest: Effort normal and breath sounds normal. No respiratory distress. She has no wheezes, rhonchi or rales. Abdominal: Soft, non-tender. Bowel sounds and aorta are normal. She exhibits no organomegaly, mass or bruit. Extremities: No edema, cyanosis, or clubbing. Pulses are 2+ radial/carotid/dorsalis pedis and posterior tibial bilaterally. Neurological: She is alert and oriented to person, place, and time. No evidence of gross cranial nerve deficit. Coordination appeared normal.   Skin: Skin is warm and dry. There is no rash or diaphoresis. Psychiatric: She has a normal mood and affect.  Her speech is normal and behavior is normal.      MOST RECENT LABS ON RECORD:   Lab Results   Component Value Date    WBC 11.3 (H) 12/04/2017    HGB 11.6 (L) 12/04/2017    HCT 36.4 12/04/2017     12/04/2017    CHOL 180 12/04/2017    TRIG 110 12/04/2017    HDL 62 12/04/2017    ALT 20 02/11/2015    AST 25 02/11/2015    NA Metoprolol Succinate (Toprol XL) 25 mg once daily I discussed the potential side effects of this medication including lightheadedness and dizziness and told her to call the office if this occurs. I discussed this plan with the hospitalist PA, Suzanne Alonso, who was in agreement with the plan and plan to discuss this as well with Dr. Peggy Rod this evening. However, I feel very comfortable letting her go home now and having this procedure done later this week. Ms. María Uribe was also in agreement with this plan. I believe that the risk of significant morbidity and mortality related to the patient's current medical conditions are: intermediate-high. >60 minutes were spent with the patient and/or coordinating care with other providers and all of his questions were answered.

## 2017-12-04 NOTE — PLAN OF CARE
Problem: Daily Care:  Goal: Daily care needs are met  Daily care needs are met   Outcome: Ongoing  Daily cares assessed and needs met according to patient condition. Patient reminded to ask for help when needed. Problem: Pain:  Goal: Patient's pain/discomfort is manageable  Patient's pain/discomfort is manageable   Outcome: Ongoing  Denies any pain at this time    Problem: Skin Integrity:  Goal: Skin integrity will stabilize  Skin integrity will stabilize   Outcome: Ongoing  Skin assessment performed at regular intervals. No redness or open areas noted. Patient reminded to turn and relieve areas known to breakdown. Continue to monitor.

## 2017-12-05 LAB
EKG ATRIAL RATE: 66 BPM
EKG P AXIS: 66 DEGREES
EKG P-R INTERVAL: 170 MS
EKG Q-T INTERVAL: 436 MS
EKG QRS DURATION: 78 MS
EKG QTC CALCULATION (BAZETT): 457 MS
EKG R AXIS: 55 DEGREES
EKG T AXIS: 54 DEGREES
EKG VENTRICULAR RATE: 66 BPM

## 2017-12-05 NOTE — PROCEDURES
[address] [address]                                CARDIAC STRESS TEST    PATIENT NAME: Mulu Guardado                  :        1960  MED REC NO:   [id number]                              ROOM:       0319  ACCOUNT NO:   [de-identified]                           ADMIT DATE: 2017  PROVIDER:     Kin Peel      Corrected Copy (2017 amm)    CARDIOVASCULAR DIAGNOSTIC DEPARTMENT    DATE OF STUDY:  2017    ORDERING PROVIDER:  Leticia Sunshine MD    PRIMARY CARE PROVIDER:  Lucien Olivera DO    INTERPRETING PHYSICIAN:  Hector Anguiano MD  EXERCISE MYOCARDIAL PERFUSION STRESS TEST REPORT    Rest/Stress single-isotope SPECT imaging with exercise stress and gated  SPECT imaging    INDICATION:  Assessment of recent chest pain and/or discomfort    CLINICAL HISTORY:  The patient is a 80-year-old woman with no known  coronary artery disease. Previous cardiac history includes:  None. Other previous history includes:  Chest pain, fatigue, asthma, dyspnea,  lightheadedness. History of CAD <60 in father. Symptoms just prior to testing included:  None. Relevant medications:  None. PROCEDURE:  The patient performed treadmill exercise using a [facility]  protocol, completing 13:21 minutes and completing an estimated workload of  62.57 metabolic equivalents (METS). The test was terminated due to fatigue and shortness of breath. The heart rate was 77 beats per minute at baseline and increased to 150  beats per minute at peak exercise, which was 92% of the maximum predicted  heart rate. The rest blood pressure was 130/70 to 158/80 mmHg and decreased  to  110/50 mmHg, which is a hypotensive  response. During the procedure,  the patient developed fatigue and shortness of breath and leg fatigue, but  denied any chest discomfort.     Myocardial perfusion imaging: Imaging was performed at rest 30-45 minutes  following the injection of 11.9 mCi of sestamibi. At peak exercise, the  patient was injected with 32.3 mCi of sestamibi and exercise was continued  for 1 minute. Gating post-stress tomographic imaging was performed 30-45  minutes after stress. STRESS ECG RESULTS:  The resting electrocardiogram demonstrated normal  sinus rhythm without definitive ST-segment abnormalities suggestive of  myocardial ischemia. At peak exercise and during recovery, the patient developed:    No significant ST segment changes suggestive of myocardial ischemia with no  premature atrial contractions (PACs) and no premature ventricular  contractions (PVCs). NUCLEAR IMAGING RESULTS:  The overall quality of the study is excellent. Mild attenuation artifact was seen. There is no evidence of abnormal lung  uptake. Additionally, the right ventricle appears normal.  The left  ventricular cavity is noted to be normal in size on the stress images. There is no evidence of transient ischemic dilatation (TID) of the left  ventricle. Gated SPECT imaging reveals normal myocardial thickening and wall motion  with a calculated left ventricular ejection fraction of 82%. The rest images demonstrate homogeneous tracer distribution throughout the  myocardium. On stress imaging, a small/moderate perfusion abnormality of mild/moderate  intensity was noted in the inferolateral and anteroseptal regions which may  be due to artifact. IMPRESSION:  1. Abnormal myocardial perfusion study. There is a small/moderate  perfusion defect of mild/moderate intensity in the inferolateral and  anteroseptal regions during stress imaging, which is most consistent with  ischemia, but may be due to artifact. In addition, transient ischemic dilatation (TID) of the left ventricle is  seen, which can be seen with uncontrolled hypertension, severe left main  coronary artery disease or severe three-vessel coronary artery disease. (TID 1.24).     2.

## 2017-12-05 NOTE — PROCEDURES
[address], [address] [place] [address] [address] [address]                                CARDIAC STRESS TEST    PATIENT NAME: Alexis Cooper                  :        1960  MED REC NO:   [id number]                              ROOM:  ACCOUNT NO:   [de-identified]                           ADMIT DATE: 2017  PROVIDER:     Janelle Pederson    CARDIOVASCULAR DIAGNOSTIC DEPARTMENT    DATE OF STUDY:  2017    ORDERING PROVIDER:  Farzad Law MD    PRIMARY CARE PROVIDER:  Owen Chan DO    INTERPRETING PHYSICIAN:  Janelle Pederson MD  EXERCISE MYOCARDIAL PERFUSION STRESS TEST REPORT    Rest/Stress single-isotope SPECT imaging with exercise stress and gated  SPECT imaging    INDICATION:  Assessment of recent chest pain and/or discomfort    CLINICAL HISTORY:  The patient is a 60-year-old woman with no known  coronary artery disease. Previous cardiac history includes:  None. Other previous history includes:  Chest pain, fatigue, asthma, dyspnea,  lightheadedness. History of CAD <60 in father. Symptoms just prior to testing included:  None. Relevant medications:  None. PROCEDURE:  The patient performed treadmill exercise using a [facility]  protocol, completing 13:21 minutes and completing an estimated workload of  57.73 metabolic equivalents (METS). The test was terminated due to fatigue and shortness of breath. The heart rate was 77 beats per minute at baseline and increased to 150  beats per minute at peak exercise, which was 92% of the maximum predicted  heart rate. The rest blood pressure was 130/70 to 158/80 mmHg and decreased  to  110/50 mmHg, which is a hypotensive  response. During the procedure,  the patient developed fatigue and shortness of breath and leg fatigue, but  denied any chest discomfort. Myocardial perfusion imaging: Imaging was performed at rest 30-45 minutes  following the injection of 11.9 mCi of sestamibi.   At EF of 82%  without regional wall motion abnormalities. 3.  No significant electrocardiographic evidence of myocardial ischemia  during EKG monitoring without significant associated arrhythmias. Overall, these results are most consistent with a low/intermediate risk for  significant coronary artery disease. Additional testing including cardiac catheterization may be indicated.           Homer Pena    D: 02/04/2017 15:07:07       T: 12/04/2017 15:08:43     [id number]  Job#: [id number]    Doc#: Unknown    CC:  Tyrese Palencia

## 2017-12-07 ENCOUNTER — HOSPITAL ENCOUNTER (OUTPATIENT)
Dept: CARDIAC CATH/INVASIVE PROCEDURES | Age: 57
Discharge: HOME | End: 2017-12-07
Payer: COMMERCIAL

## 2017-12-07 VITALS
BODY MASS INDEX: 23.32 KG/M2 | WEIGHT: 140 LBS | RESPIRATION RATE: 14 BRPM | HEIGHT: 65 IN | DIASTOLIC BLOOD PRESSURE: 72 MMHG | OXYGEN SATURATION: 98 % | HEART RATE: 75 BPM | TEMPERATURE: 97.2 F | SYSTOLIC BLOOD PRESSURE: 102 MMHG

## 2017-12-07 PROCEDURE — 2580000003 HC RX 258: Performed by: FAMILY MEDICINE

## 2017-12-07 PROCEDURE — C1894 INTRO/SHEATH, NON-LASER: HCPCS

## 2017-12-07 PROCEDURE — C1725 CATH, TRANSLUMIN NON-LASER: HCPCS

## 2017-12-07 PROCEDURE — C1887 CATHETER, GUIDING: HCPCS

## 2017-12-07 PROCEDURE — 93458 L HRT ARTERY/VENTRICLE ANGIO: CPT | Performed by: FAMILY MEDICINE

## 2017-12-07 PROCEDURE — C1769 GUIDE WIRE: HCPCS

## 2017-12-07 PROCEDURE — 6360000002 HC RX W HCPCS

## 2017-12-07 PROCEDURE — 2500000003 HC RX 250 WO HCPCS

## 2017-12-07 RX ORDER — ACETAMINOPHEN 325 MG/1
650 TABLET ORAL EVERY 4 HOURS PRN
Status: DISCONTINUED | OUTPATIENT
Start: 2017-12-07 | End: 2017-12-08 | Stop reason: HOSPADM

## 2017-12-07 RX ORDER — SODIUM CHLORIDE 0.9 % (FLUSH) 0.9 %
10 SYRINGE (ML) INJECTION PRN
Status: DISCONTINUED | OUTPATIENT
Start: 2017-12-07 | End: 2017-12-08 | Stop reason: HOSPADM

## 2017-12-07 RX ORDER — ATORVASTATIN CALCIUM 40 MG/1
40 TABLET, FILM COATED ORAL DAILY
Qty: 30 TABLET | Refills: 3 | Status: SHIPPED | OUTPATIENT
Start: 2017-12-07 | End: 2019-05-02

## 2017-12-07 RX ORDER — NITROGLYCERIN 0.4 MG/1
0.4 TABLET SUBLINGUAL EVERY 5 MIN PRN
Status: DISCONTINUED | OUTPATIENT
Start: 2017-12-07 | End: 2017-12-08 | Stop reason: HOSPADM

## 2017-12-07 RX ORDER — DIPHENHYDRAMINE HCL 25 MG
50 CAPSULE ORAL ONCE
Status: DISCONTINUED | OUTPATIENT
Start: 2017-12-07 | End: 2017-12-08 | Stop reason: HOSPADM

## 2017-12-07 RX ORDER — SODIUM CHLORIDE 9 MG/ML
INJECTION, SOLUTION INTRAVENOUS CONTINUOUS
Status: DISCONTINUED | OUTPATIENT
Start: 2017-12-07 | End: 2017-12-08 | Stop reason: HOSPADM

## 2017-12-07 RX ORDER — SODIUM CHLORIDE 0.9 % (FLUSH) 0.9 %
10 SYRINGE (ML) INJECTION EVERY 12 HOURS SCHEDULED
Status: DISCONTINUED | OUTPATIENT
Start: 2017-12-07 | End: 2017-12-08 | Stop reason: HOSPADM

## 2017-12-07 RX ADMIN — SODIUM CHLORIDE: 9 INJECTION, SOLUTION INTRAVENOUS at 12:21

## 2017-12-07 NOTE — H&P
Patient examined the patient and have reviewed H&P the from 12/4/17 and I agree with the current assessment and plan with no significant change in the patients condition.

## 2017-12-07 NOTE — DISCHARGE SUMMARY
Shahid Yates M.D. Internal Medicine Discharge Summary    Patient ID:  Charlie Chris  [id number]  1960    Admission date: 12/3/2017    Discharge date: 12/7/2017     Admitting Physician: No att. providers found     Primary Care Physician: Rachel Soto DO     Primary Discharge Diagnoses:   Patient Active Problem List    Diagnosis Date Noted    Abnormal cardiovascular stress test     Chest pain 12/03/2017    Bronchitis 12/03/2017    Mild intermittent asthma with acute exacerbation 12/03/2017    Colon cancer screening 11/11/2013       Additional Diagnoses:       Diagnosis Date    Thyroid disease         Hospital Course: The patient was admitted for chest pain and intermittent asthma. 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 Discharge Exam:  GEN:    Awake, alert and oriented x 3. no acute distress  CVS:    RRR, no murmur, rub or gallop  PULM: CTA, no wheezes, rales or rhonchi  ABD:    Bowels sounds normal.  Abdomen is soft. No distention. No tenderness. EXT:   no edema bilaterally . No calf tenderness.      Consultants:    · Dr. Dionte Edmond, cardiology    Procedures:    · Stress Test: which revealed TID    Complications:   · none    Significant Diagnostic Studies:   · Discharge Labs:  Lab Results   Component Value Date    WBC 11.3 (H) 12/04/2017    HGB 11.6 (L) 12/04/2017    MCV 81.8 12/04/2017     12/04/2017      Lab Results   Component Value Date    GLUCOSE 104 (H) 12/04/2017    BUN 13 12/04/2017    CREATININE 0.55 12/04/2017     12/04/2017    K 4.2 12/04/2017    CALCIUM 9.2 12/04/2017     12/04/2017    CO2 24 12/04/2017       Discharge Condition:   · good    Disposition:   · Discharge to Home    Discharge Medications:   Ruthellen Emerald   Home Medication Instructions [facility]:[id number]    Printed on:12/07/17 7094   Medication Information                      ALPRAZolam (XANAX) 0.25 MG tablet  Take 0.25 mg by mouth every 8 hours as needed. aspirin 81 MG tablet  Take 81 mg by mouth daily. azithromycin (ZITHROMAX) 250 MG tablet  Take 250 mg by mouth daily             Biotin 5000 MCG CAPS  Take 1 capsule by mouth daily             levothyroxine (SYNTHROID) 200 MCG tablet  Take 150 mcg by mouth Daily              metoprolol succinate (TOPROL XL) 25 MG extended release tablet  Take 1 tablet by mouth daily             mometasone-formoterol (DULERA) 200-5 MCG/ACT inhaler  Inhale 2 puffs into the lungs 2 times daily             Omega-3 Fatty Acids (FISH OIL) 1000 MG CAPS  Take 2,000 mg by mouth daily             pravastatin (PRAVACHOL) 10 MG tablet  Take 10 mg by mouth daily. Patient Instructions:   · Activity: activity as tolerated  · Diet: cardiac diet  · Wound Care: none needed  · Follow up with Leeann Castellano DO in 1-2 weeks   · Follow-up with DR Palencia  in 2-3 days as directed    CORE MEASURES on Discharge (if applicable)  ACE/ARB in CHF: N/A  ASA in MI: Yes  Statin in MI: Yes  Statin in CVA: N/A  Antiplatelet in CVA: N/A    Total time spent on discharge services: 35 minutes  Including the following activities:  · Evaluation and Management of patient  · Discussion with patient and/or surrogate about current care plan  · Coordination with Case Management and/or   · Coordination of care with Consultants (if applicable)   · Coordination of care with Receiving Facility Physician (if applicable)  · Completion of DME forms (if applicable)  · Preparation of Discharge Summary  · Preparation of Medication Reconciliation  · Preparation of Discharge Prescriptions      Signed:   Ute Rojas M.D.  12/7/2017  11:52 AM

## 2019-04-24 ENCOUNTER — HOSPITAL ENCOUNTER (OUTPATIENT)
Dept: WOMENS IMAGING | Age: 59
Discharge: HOME | End: 2019-04-26
Payer: COMMERCIAL

## 2019-04-24 DIAGNOSIS — Z12.39 BREAST CANCER SCREENING: ICD-10-CM

## 2019-04-24 PROCEDURE — G0279 TOMOSYNTHESIS, MAMMO: HCPCS

## 2019-05-02 ENCOUNTER — OFFICE VISIT (OUTPATIENT)
Dept: OBGYN | Age: 59
End: 2019-05-02
Payer: COMMERCIAL

## 2019-05-02 ENCOUNTER — HOSPITAL ENCOUNTER (OUTPATIENT)
Age: 59
Setting detail: SPECIMEN
Discharge: HOME | End: 2019-05-02
Payer: COMMERCIAL

## 2019-05-02 VITALS
DIASTOLIC BLOOD PRESSURE: 68 MMHG | BODY MASS INDEX: 23.99 KG/M2 | HEIGHT: 65 IN | SYSTOLIC BLOOD PRESSURE: 120 MMHG | WEIGHT: 144 LBS

## 2019-05-02 DIAGNOSIS — Z01.419 ENCOUNTER FOR ANNUAL ROUTINE GYNECOLOGICAL EXAMINATION: Primary | ICD-10-CM

## 2019-05-02 DIAGNOSIS — N89.8 VAGINAL DRYNESS: ICD-10-CM

## 2019-05-02 PROCEDURE — 99396 PREV VISIT EST AGE 40-64: CPT | Performed by: ADVANCED PRACTICE MIDWIFE

## 2019-05-02 PROCEDURE — G0145 SCR C/V CYTO,THINLAYER,RESCR: HCPCS

## 2019-05-02 RX ORDER — PRAVASTATIN SODIUM 10 MG
10 TABLET ORAL DAILY
COMMUNITY

## 2019-05-02 RX ORDER — MULTIVIT WITH MINERALS/LUTEIN
250 TABLET ORAL DAILY
COMMUNITY

## 2019-05-02 ASSESSMENT — PATIENT HEALTH QUESTIONNAIRE - PHQ9
SUM OF ALL RESPONSES TO PHQ9 QUESTIONS 1 & 2: 0
SUM OF ALL RESPONSES TO PHQ QUESTIONS 1-9: 0
2. FEELING DOWN, DEPRESSED OR HOPELESS: 0
SUM OF ALL RESPONSES TO PHQ QUESTIONS 1-9: 0
1. LITTLE INTEREST OR PLEASURE IN DOING THINGS: 0

## 2019-05-02 NOTE — PROGRESS NOTES
self breast exams: Yes    Past Medical History:   Diagnosis Date    High cholesterol     Thyroid disease        Past Surgical History:   Procedure Laterality Date    CARDIAC CATHETERIZATION Left 2017    [facility]     SECTION      COLONOSCOPY      FRACTURE SURGERY Right 1988    [facility]    HYSTEROSCOPY         Family History   Problem Relation Age of Onset    Other Other         No family h/o ovarian or breast cancer . No family h/O DVT. Chief Complaint   Patient presents with    Gynecologic Exam     Yearly exam. Last pap  ? PE:  Vital Signs  Blood pressure 120/68, height 5' 5\" (1.651 m), weight 144 lb (65.3 kg), not currently breastfeeding. Labs:    No results found for this visit on 19. NURSE: Barb MARCELO    HPI: here for annual exam, c/o vaginal dryness    Review of Systems   Constitutional: Negative. HENT: Negative for congestion and sore throat. Respiratory: Negative for shortness of breath. Cardiovascular: Negative for chest pain. Gastrointestinal: Negative for abdominal pain, constipation, diarrhea and nausea. Genitourinary: Negative for dysuria. Musculoskeletal: Negative for back pain. Skin: Negative for rash. Neurological: Negative for headaches. Psychiatric/Behavioral: The patient is not nervous/anxious. Objective  Lymphatic:   no lymphadenopathy  Heent:   negative   Cor: regular rate and rhythm, no murmurs              Pul:clear to auscultation bilaterally- no wheezes, rales or rhonchi, normal air movement, no respiratory distress      GI: Abdomen soft, non-tender.  BS normal. No masses,  No organomegaly           Extremities: normal strength, tone, and muscle mass   Breasts: Breast:normal appearance, no masses or tenderness   Pelvic Exam: GENITAL/URINARY:  External Genitalia:  General appearance; normal, Hair distribution; normal, Lesions absent, atrophic  Urethral Meatus:  Size normal,

## 2019-05-06 ASSESSMENT — ENCOUNTER SYMPTOMS
NAUSEA: 0
SHORTNESS OF BREATH: 0
ABDOMINAL PAIN: 0
SORE THROAT: 0
CONSTIPATION: 0
BACK PAIN: 0
DIARRHEA: 0

## 2019-05-09 LAB — CYTOLOGY REPORT: NORMAL

## 2020-10-28 ENCOUNTER — HOSPITAL ENCOUNTER (OUTPATIENT)
Dept: WOMENS IMAGING | Age: 60
Discharge: HOME | End: 2020-10-30
Payer: COMMERCIAL

## 2020-10-28 PROCEDURE — 77063 BREAST TOMOSYNTHESIS BI: CPT

## 2020-12-03 ENCOUNTER — OFFICE VISIT (OUTPATIENT)
Dept: OBGYN | Age: 60
End: 2020-12-03
Payer: COMMERCIAL

## 2020-12-03 VITALS
WEIGHT: 147 LBS | HEIGHT: 65 IN | SYSTOLIC BLOOD PRESSURE: 120 MMHG | DIASTOLIC BLOOD PRESSURE: 74 MMHG | BODY MASS INDEX: 24.49 KG/M2

## 2020-12-03 PROCEDURE — 99396 PREV VISIT EST AGE 40-64: CPT | Performed by: ADVANCED PRACTICE MIDWIFE

## 2020-12-03 PROCEDURE — G8484 FLU IMMUNIZE NO ADMIN: HCPCS | Performed by: ADVANCED PRACTICE MIDWIFE

## 2020-12-03 RX ORDER — MONTELUKAST SODIUM 10 MG/1
TABLET ORAL
COMMUNITY
Start: 2020-09-23

## 2020-12-03 ASSESSMENT — PATIENT HEALTH QUESTIONNAIRE - PHQ9
2. FEELING DOWN, DEPRESSED OR HOPELESS: 1
SUM OF ALL RESPONSES TO PHQ QUESTIONS 1-9: 2
SUM OF ALL RESPONSES TO PHQ9 QUESTIONS 1 & 2: 2
1. LITTLE INTEREST OR PLEASURE IN DOING THINGS: 1
SUM OF ALL RESPONSES TO PHQ QUESTIONS 1-9: 2
SUM OF ALL RESPONSES TO PHQ QUESTIONS 1-9: 2

## 2020-12-03 ASSESSMENT — ENCOUNTER SYMPTOMS
SHORTNESS OF BREATH: 0
ABDOMINAL PAIN: 0
BACK PAIN: 0

## 2020-12-03 NOTE — PROGRESS NOTES
YEARLY PHYSICAL    Date of service: 12/3/2020    Isidra Favorite  Is a 61 y.o.   female    PT's PCP is: Joy Noriega DO     : 1960                                             Subjective:       No LMP recorded. Patient is postmenopausal.     Are your menses regular: not applicable    OB History    Para Term  AB Living   1         1   SAB TAB Ectopic Molar Multiple Live Births                    # Outcome Date GA Lbr Dayday/2nd Weight Sex Delivery Anes PTL Lv   1                  Social History     Tobacco Use   Smoking Status Former Smoker    Years: 27.00   Smokeless Tobacco Never Used        Social History     Substance and Sexual Activity   Alcohol Use Yes    Comment: social       Family History   Problem Relation Age of Onset    Other Other         No family h/o ovarian or breast cancer . No family h/O DVT. Any family history of breast or ovarian cancer: No    Any family history of blood clots: No      Allergies: Patient has no known allergies. Current Outpatient Medications:     montelukast (SINGULAIR) 10 MG tablet, , Disp: , Rfl:     Ascorbic Acid (VITAMIN C) 250 MG tablet, Take 250 mg by mouth daily, Disp: , Rfl:     Biotin 5000 MCG CAPS, Take 1 capsule by mouth daily, Disp: , Rfl:     Omega-3 Fatty Acids (FISH OIL) 1000 MG CAPS, Take 2,000 mg by mouth daily, Disp: , Rfl:     levothyroxine (SYNTHROID) 200 MCG tablet, Take 150 mcg by mouth Daily , Disp: , Rfl:     ALPRAZolam (XANAX) 0.25 MG tablet, Take 0.25 mg by mouth every 8 hours as needed. , Disp: , Rfl:     pravastatin (PRAVACHOL) 10 MG tablet, Take 10 mg by mouth daily, Disp: , Rfl:     Social History     Substance and Sexual Activity   Sexual Activity Not Currently    Partners: Male       Any bleeding or pain with intercourse:  na    Last Yearly:  2019    Last pap: 2019    Last HPV: 2016 ?     Last Mammogram: 10/2020    Last Dexascan never    Last colorectal screen- type:colonoscopy*  date      Do you do self breast exams: Yes    Past Medical History:   Diagnosis Date    High cholesterol     Thyroid disease        Past Surgical History:   Procedure Laterality Date    CARDIAC CATHETERIZATION Left 2017    [facility]     SECTION      COLONOSCOPY  2013    FRACTURE SURGERY Right 1988    [place]    HYSTEROSCOPY         Family History   Problem Relation Age of Onset    Other Other         No family h/o ovarian or breast cancer . No family h/O DVT. Chief Complaint   Patient presents with    Gynecologic Exam     Yearly exam. Last pap 2019 negative, last HPV  ? Check small lump vulva ? Symptoms for approx. 1 month. PE:  Vital Signs  Blood pressure 120/74, height 5' 5\" (1.651 m), weight 147 lb (66.7 kg), not currently breastfeeding. Estimated body mass index is 24.46 kg/m² as calculated from the following:    Height as of this encounter: 5' 5\" (1.651 m). Weight as of this encounter: 147 lb (66.7 kg). Labs:    No results found for this visit on 20. NURSE:  C.smith LPN    HPI: here for annual gyn exam, c/o small vulvar bump that \"seems to be getting smaller\"    Review of Systems   Constitutional: Negative. HENT: Negative for congestion. Respiratory: Negative for shortness of breath. Cardiovascular: Negative for chest pain. Gastrointestinal: Negative for abdominal pain. Genitourinary: Negative for dysuria. Musculoskeletal: Negative for back pain. Skin: Negative for rash. Neurological: Negative for headaches. Psychiatric/Behavioral: The patient is not nervous/anxious.           Objective   No acute distress  Excellent communications  Well-nourished  Lymphatic:   no lymphadenopathy  Heent:   negative   Cor: regular rate and rhythm, no murmurs              Pul:clear to auscultation bilaterally- no wheezes, rales or rhonchi, normal air movement, no respiratory distress      GI: Abdomen soft, non-tender. BS normal. No masses,  No organomegaly           Extremities: normal strength, tone, and muscle mass   Breasts: Breast:normal appearance, no masses or tenderness   Pelvic Exam: GENITAL/URINARY:  External Genitalia:  General appearance; normal, Hair distribution; normal, Lesions absent, points on left l. Minora 2-3 mm round bump, no redness or tenderness, appears as sebaceous cyst  Urethral Meatus:  Size normal, Location normal, Lesions absent, Prolapse absent  Urethra: Fullness absent, Masses absent  Bladder:  Fullness absent, Masses absent, Tenderness absent, Cystocele absent  Vagina:  General appearance normal, Estrogen effect normal, Discharge absent, Lesions absent, Pelvic support normal  Cervix:  General appearance normal, Lesions absent, Discharge absent, Tenderness absent, Enlargement absent, Nodularity absent  Uterus:  Size normal, Tenderness absent  Adenexa: Masses absent, Tenderness absent  Anus/Perineum:  Lesions absent and Masses absent                                                               Assessment and Plan          Diagnosis Orders   1. Encounter for annual routine gynecological examination     2. Sebaceous cyst of labia       Reviewed danger signs of infected sebaceous cyst and reviewed comfort measures/ warm soaks        I am having Alvaro Jetty maintain her levothyroxine, ALPRAZolam, Biotin, fish oil, pravastatin, vitamin C, and montelukast.    Return in about 1 year (around 12/3/2021) for yearly. She was also counseled on her preventative health maintenance recommendations and follow-up. There are no Patient Instructions on file for this visit.     Ha Monk,12/3/2020 8:33 AM

## 2021-12-08 ENCOUNTER — OFFICE VISIT (OUTPATIENT)
Dept: OBGYN | Age: 61
End: 2021-12-08
Payer: COMMERCIAL

## 2021-12-08 VITALS
BODY MASS INDEX: 24.29 KG/M2 | SYSTOLIC BLOOD PRESSURE: 130 MMHG | DIASTOLIC BLOOD PRESSURE: 74 MMHG | HEIGHT: 65 IN | WEIGHT: 145.8 LBS

## 2021-12-08 DIAGNOSIS — Z12.31 SCREENING MAMMOGRAM, ENCOUNTER FOR: ICD-10-CM

## 2021-12-08 DIAGNOSIS — Z12.4 SCREENING FOR MALIGNANT NEOPLASM OF CERVIX: ICD-10-CM

## 2021-12-08 DIAGNOSIS — Z01.419 ENCOUNTER FOR ANNUAL ROUTINE GYNECOLOGICAL EXAMINATION: Primary | ICD-10-CM

## 2021-12-08 PROCEDURE — 99396 PREV VISIT EST AGE 40-64: CPT | Performed by: ADVANCED PRACTICE MIDWIFE

## 2021-12-08 PROCEDURE — G8484 FLU IMMUNIZE NO ADMIN: HCPCS | Performed by: ADVANCED PRACTICE MIDWIFE

## 2021-12-08 RX ORDER — ALBUTEROL SULFATE 90 UG/1
AEROSOL, METERED RESPIRATORY (INHALATION)
COMMUNITY
Start: 2021-10-15

## 2021-12-08 RX ORDER — LEVOTHYROXINE SODIUM 150 MCG
TABLET ORAL
COMMUNITY
Start: 2021-10-11

## 2021-12-08 ASSESSMENT — PATIENT HEALTH QUESTIONNAIRE - PHQ9
1. LITTLE INTEREST OR PLEASURE IN DOING THINGS: 0
SUM OF ALL RESPONSES TO PHQ QUESTIONS 1-9: 0
SUM OF ALL RESPONSES TO PHQ9 QUESTIONS 1 & 2: 0
SUM OF ALL RESPONSES TO PHQ QUESTIONS 1-9: 0
SUM OF ALL RESPONSES TO PHQ QUESTIONS 1-9: 0
2. FEELING DOWN, DEPRESSED OR HOPELESS: 0

## 2021-12-08 ASSESSMENT — ENCOUNTER SYMPTOMS
SHORTNESS OF BREATH: 0
BACK PAIN: 0
ABDOMINAL PAIN: 0

## 2021-12-08 NOTE — PROGRESS NOTES
YEARLY PHYSICAL    Date of service: 2021    Miya Alonso  Is a 64 y.o.   female    PT's PCP is: Rabia Lema DO     : 1960                                             Subjective:       No LMP recorded. Patient is postmenopausal.     Are your menses regular: no    OB History    Para Term  AB Living   1         1   SAB IAB Ectopic Molar Multiple Live Births                    # Outcome Date GA Lbr Dayday/2nd Weight Sex Delivery Anes PTL Lv   1                  Social History     Tobacco Use   Smoking Status Former Smoker    Years: 27.00   Smokeless Tobacco Never Used        Social History     Substance and Sexual Activity   Alcohol Use Yes    Comment: social       Family History   Problem Relation Age of Onset    Other Mother     Heart Failure Father     Other Other         No family h/o ovarian or breast cancer . No family h/O DVT. Any family history of breast or ovarian cancer: No    Any family history of blood clots: No    Have you had a positive covid test: No    Have you had the covid immunization: Yes      Allergies: Patient has no known allergies. Current Outpatient Medications:     albuterol sulfate  (90 Base) MCG/ACT inhaler, , Disp: , Rfl:     SYNTHROID 150 MCG tablet, , Disp: , Rfl:     montelukast (SINGULAIR) 10 MG tablet, , Disp: , Rfl:     Ascorbic Acid (VITAMIN C) 250 MG tablet, Take 250 mg by mouth daily, Disp: , Rfl:     Biotin 5000 MCG CAPS, Take 1 capsule by mouth daily, Disp: , Rfl:     Omega-3 Fatty Acids (FISH OIL) 1000 MG CAPS, Take 2,000 mg by mouth daily, Disp: , Rfl:     ALPRAZolam (XANAX) 0.25 MG tablet, Take 0.25 mg by mouth every 8 hours as needed. , Disp: , Rfl:     pravastatin (PRAVACHOL) 10 MG tablet, Take 10 mg by mouth daily (Patient not taking: Reported on 2021), Disp: , Rfl:     levothyroxine (SYNTHROID) 200 MCG tablet, Take 150 mcg by mouth Daily  (Patient not taking: Reported on 2021), Disp: , Rfl:     Social History     Substance and Sexual Activity   Sexual Activity Not Currently    Partners: Male       Any bleeding or pain with intercourse:  NA    Last Yearly:      Last pap:     Last HPV: 2016    Last Mammogram: 10/2020    Last Colan Gene never    Last colorectal screen- type:colonoscopy*  date      Do you do self breast exams: Yes    Past Medical History:   Diagnosis Date    High cholesterol     Thyroid disease        Past Surgical History:   Procedure Laterality Date    [facility]    [facility]     SECTION      COLONOSCOPY  2013    FRACTURE SURGERY Right 1988    [place]    HYSTEROSCOPY         Family History   Problem Relation Age of Onset    Other Mother     Heart Failure Father     Other Other         No family h/o ovarian or breast cancer . No family h/O DVT. Chief Complaint   Patient presents with    Gynecologic Exam     Yearly exam. Last pap 2019 negative, HPV  ? PE:  Vital Signs  Blood pressure 130/74, height 5' 5\" (1.651 m), weight 145 lb 12.8 oz (66.1 kg), not currently breastfeeding. Estimated body mass index is 24.26 kg/m² as calculated from the following:    Height as of this encounter: 5' 5\" (1.651 m). Weight as of this encounter: 145 lb 12.8 oz (66.1 kg). Labs:    No results found for this visit on 21. PHQ-9 Total Score: 0 (2021  8:14 AM)      NURSE: Barb MARCELO    HPI: here for annual gyn exam, c/o \"female odor\"    Review of Systems   Constitutional: Negative. HENT: Negative for congestion. Respiratory: Negative for shortness of breath. Cardiovascular: Negative for chest pain. Gastrointestinal: Negative for abdominal pain. Genitourinary: Negative for dysuria. Musculoskeletal: Negative for back pain. Skin: Negative for rash. Neurological: Negative for headaches. Psychiatric/Behavioral: The patient is not nervous/anxious. Objective  Lymphatic:   no lymphadenopathy  Heent:   negative   Cor: regular rate and rhythm, no murmurs              Pul:clear to auscultation bilaterally- no wheezes, rales or rhonchi, normal air movement, no respiratory distress      GI: Abdomen soft, non-tender. BS normal. No masses,  No organomegaly           Extremities: normal strength, tone, and muscle mass   Breasts: Breast:normal appearance, no masses or tenderness   Pelvic Exam: GENITAL/URINARY:  External Genitalia:  General appearance; normal, Hair distribution; normal, Lesions absent  Urethral Meatus:  Size normal, Location normal, Lesions absent, Prolapse absent  Urethra: Fullness absent, Masses absent  Bladder:  Fullness absent, Masses absent, Tenderness absent, Cystocele absent  Vagina:  General appearance normal, Estrogen effect normal, Discharge absent, Lesions absent, Pelvic support normal  Cervix:  General appearance normal, Lesions absent, Discharge absent, Tenderness absent, Enlargement absent, Nodularity absent, stenotic os  Uterus:  Size normal, Tenderness absent  Adenexa: Masses absent, Tenderness absent  Anus/Perineum:  Lesions absent and Masses absent                                                              Assessment and Plan          Diagnosis Orders   1. Encounter for annual routine gynecological examination     2. Screening mammogram, encounter for  [facility] AMILCAR DIGITAL SCREEN BILATERAL   3. Screening for malignant neoplasm of cervix  PAP SMEAR        encouraged OTC rephresh      I am having Lindsay Huerta maintain her levothyroxine, ALPRAZolam, Biotin, fish oil, pravastatin, vitamin C, montelukast, albuterol sulfate HFA, and Synthroid. Return in about 1 year (around 12/8/2022) for yearly. She was also counseled on her preventative health maintenance recommendations and follow-up. There are no Patient Instructions on file for this visit.     Guillermo Enriquez CORI Gupta - PATT,12/8/2021 8:45 AM

## 2021-12-14 LAB — GYNECOLOGY CYTOLOGY REPORT: NORMAL

## 2022-03-16 ENCOUNTER — HOSPITAL ENCOUNTER (OUTPATIENT)
Dept: WOMENS IMAGING | Age: 62
Discharge: HOME | End: 2022-03-18
Payer: COMMERCIAL

## 2022-03-16 DIAGNOSIS — Z12.31 SCREENING MAMMOGRAM, ENCOUNTER FOR: ICD-10-CM

## 2022-03-16 PROCEDURE — 77063 BREAST TOMOSYNTHESIS BI: CPT

## 2022-10-30 LAB
ABSOLUTE BASO #: 0.04 K/UL (ref 0–0.2)
ABSOLUTE EOS #: 0.14 K/UL (ref 0–0.5)
ABSOLUTE LYMPH #: 1.67 K/UL (ref 1–4)
ABSOLUTE MONO #: 0.66 K/UL (ref 0.2–1)
ABSOLUTE NEUT #: 4.06 K/UL (ref 1.5–7.5)
ALBUMIN SERPL-MCNC: 4.2 G/DL (ref 3.5–5.2)
ALK PHOSPHATASE: 212 U/L (ref 40–140)
ALT SERPL-CCNC: 28 U/L (ref 5–40)
ANION GAP SERPL CALCULATED.3IONS-SCNC: 9 MEQ/L (ref 7–16)
AST SERPL-CCNC: 32 U/L (ref 9–40)
BASOPHILS RELATIVE PERCENT: 0.6 %
BILIRUB SERPL-MCNC: 0.2 MG/DL
BUN BLDV-MCNC: 17 MG/DL (ref 8–23)
CALCIUM SERPL-MCNC: 8.8 MG/DL (ref 8.5–10.5)
CHLORIDE BLD-SCNC: 102 MEQ/L (ref 95–107)
CHOLESTEROL/HDL RATIO: 4.7 RATIO
CHOLESTEROL: 197 MG/DL
CO2: 29 MEQ/L (ref 19–31)
CREAT SERPL-MCNC: 0.69 MG/DL (ref 0.6–1.3)
EGFR IF NONAFRICAN AMERICAN: 98 ML/MIN/1.73
EOSINOPHILS RELATIVE PERCENT: 2.1 %
GLUCOSE: 83 MG/DL (ref 70–99)
HCT VFR BLD CALC: 36.2 % (ref 34–45)
HDLC SERPL-MCNC: 42 MG/DL
HEMOGLOBIN: 11.6 G/DL (ref 11.5–15.5)
LDL CHOLESTEROL CALCULATED: 138 MG/DL
LDL/HDL RATIO: 3.3 RATIO
LYMPHOCYTE %: 25.4 %
MCH RBC QN AUTO: 27.6 PG (ref 25–33)
MCHC RBC AUTO-ENTMCNC: 32 G/DL (ref 31–36)
MCV RBC AUTO: 86 FL (ref 80–99)
MONOCYTES # BLD: 10 %
NEUTROPHILS RELATIVE PERCENT: 61.7 %
PDW BLD-RTO: 13 % (ref 11.5–15)
PLATELETS: 345 K/UL (ref 130–400)
PMV BLD AUTO: 9.4 FL (ref 9.3–13)
POTASSIUM SERPL-SCNC: 4.5 MEQ/L (ref 3.5–5.4)
RBC: 4.21 M/UL (ref 3.8–5.4)
SODIUM BLD-SCNC: 140 MEQ/L (ref 133–146)
T4 TOTAL: 14.6 UG/DL (ref 4.5–10.5)
TOTAL PROTEIN: 6.8 G/DL (ref 6.1–8.3)
TRIGL SERPL-MCNC: 85 MG/DL
TSH SERPL DL<=0.05 MIU/L-ACNC: 0.27 UIU/ML (ref 0.4–4.1)
VLDLC SERPL CALC-MCNC: 17 MG/DL
WBC: 6.6 K/UL (ref 3.5–11)

## 2023-01-05 ENCOUNTER — OFFICE VISIT (OUTPATIENT)
Dept: OBGYN | Age: 63
End: 2023-01-05

## 2023-01-05 VITALS
WEIGHT: 150 LBS | SYSTOLIC BLOOD PRESSURE: 122 MMHG | DIASTOLIC BLOOD PRESSURE: 68 MMHG | HEIGHT: 65 IN | BODY MASS INDEX: 24.99 KG/M2

## 2023-01-05 DIAGNOSIS — Z12.31 SCREENING MAMMOGRAM, ENCOUNTER FOR: ICD-10-CM

## 2023-01-05 DIAGNOSIS — Z01.419 ENCOUNTER FOR ANNUAL ROUTINE GYNECOLOGICAL EXAMINATION: Primary | ICD-10-CM

## 2023-01-05 RX ORDER — URSODIOL 500 MG/1
TABLET, FILM COATED ORAL
COMMUNITY
Start: 2022-11-12

## 2023-01-05 NOTE — PROGRESS NOTES
YEARLY PHYSICAL    Date of service: 2023    Ronna Webb  Is a 58 y.o.  , female    PT's PCP is: Ramirez Banegas DO     : 1960                                             Subjective:       No LMP recorded. Patient is postmenopausal.     Are your menses regular: not applicable    OB History    Para Term  AB Living   1 1 1     1   SAB IAB Ectopic Molar Multiple Live Births                    # Outcome Date GA Lbr Dayday/2nd Weight Sex Delivery Anes PTL Lv   1 Term                 Social History     Tobacco Use   Smoking Status Former    Years: 27.00    Types: Cigarettes   Smokeless Tobacco Never        Social History     Substance and Sexual Activity   Alcohol Use Yes    Comment: social       Family History   Problem Relation Age of Onset    Other Mother         liver disease    Cancer Father         bladder    Heart Failure Father     Other Sister         WPW    Other Other         No family h/o ovarian or breast cancer . No family h/O DVT. Any family history of breast or ovarian cancer: No    Any family history of blood clots: No    Have you had a positive covid test: Yes    Have you had the covid immunization: Yes      Allergies: Patient has no known allergies.       Current Outpatient Medications:     ursodiol (ACTIGALL) 500 MG tablet, , Disp: , Rfl:     albuterol sulfate  (90 Base) MCG/ACT inhaler, , Disp: , Rfl:     montelukast (SINGULAIR) 10 MG tablet, , Disp: , Rfl:     Ascorbic Acid (VITAMIN C) 250 MG tablet, Take 250 mg by mouth daily, Disp: , Rfl:     Biotin 5000 MCG CAPS, Take 1 capsule by mouth daily, Disp: , Rfl:     Omega-3 Fatty Acids (FISH OIL) 1000 MG CAPS, Take 2,000 mg by mouth daily, Disp: , Rfl:     levothyroxine (SYNTHROID) 200 MCG tablet, Take 150 mcg by mouth Daily, Disp: , Rfl:     SYNTHROID 150 MCG tablet, , Disp: , Rfl:     pravastatin (PRAVACHOL) 10 MG tablet, Take 10 mg by mouth daily (Patient not taking: Reported on 2021), Disp: , Rfl:     ALPRAZolam (XANAX) 0.25 MG tablet, Take 0.25 mg by mouth every 8 hours as needed., Disp: , Rfl:     Social History     Substance and Sexual Activity   Sexual Activity Not Currently    Partners: Male       Any bleeding or pain with intercourse: na    Last Yearly date: 2021    Last pap date and results: 2021 negative    Last HPV date and results: 2021 negative    Last Mammogram date and results: 2022    Last Dexa scan date and results:  [place]    Last colorectal screen- type:colonoscopy*  date   results negative    Do you do self breast exams: Yes    Past Medical History:   Diagnosis Date    High cholesterol     Liver disease     Primary biliary cholangitis (HCC)     Thyroid disease        Past Surgical History:   Procedure Laterality Date    CARDIAC CATHETERIZATION Left 2017    [facility]     SECTION      COLONOSCOPY  2013    FRACTURE SURGERY Right 1988    [facility]    HYSTEROSCOPY         Family History   Problem Relation Age of Onset    Other Mother         liver disease    Cancer Father         bladder    Heart Failure Father     Other Sister         WPW    Other Other         No family h/o ovarian or breast cancer .No family h/O DVT.        Chief Complaint   Patient presents with    Gynecologic Exam     Yearly exam. Last pap 2021 negative, HPV not detected.           PE:  Vital Signs  Blood pressure 122/68, height 5' 5\" (1.651 m), weight 150 lb (68 kg), not currently breastfeeding.  Estimated body mass index is 24.96 kg/m² as calculated from the following:    Height as of this encounter: 5' 5\" (1.651 m).    Weight as of this encounter: 150 lb (68 kg).    Labs:    No results found for this visit on 23.    No data recorded    NURSE: Barb MARCELO    HPI: here for annual gyn exam; dx with PBC this year    Review of Systems   Constitutional: Negative.    HENT:   Negative for congestion. Respiratory:  Negative for shortness of breath. Cardiovascular:  Negative for chest pain. Gastrointestinal:  Negative for abdominal pain. Genitourinary:  Negative for dysuria. Musculoskeletal:  Negative for back pain. Skin:  Negative for rash. Neurological:  Negative for headaches. Psychiatric/Behavioral:  The patient is not nervous/anxious. Objective  Lymphatic:   no lymphadenopathy  Heent:   negative   Cor: regular rate and rhythm, no murmurs              Pul:clear to auscultation bilaterally- no wheezes, rales or rhonchi, normal air movement, no respiratory distress      GI: Abdomen soft, non-tender. BS normal. No masses,  No organomegaly           Extremities: normal strength, tone, and muscle mass   Breasts: Breast:normal appearance, no masses or tenderness   Pelvic Exam: GENITAL/URINARY:  External Genitalia:  General appearance; normal, Hair distribution; normal, Lesions absent  Urethral Meatus:  Size normal, Location normal, Lesions absent, Prolapse absent  Urethra: Fullness absent, Masses absent  Bladder:  Fullness absent, Masses absent, Tenderness absent, Cystocele absent  Vagina:  General appearance normal, Estrogen effect normal, Discharge absent, Lesions absent, Pelvic support normal  Cervix:  General appearance normal, Lesions absent, Discharge absent, Tenderness absent, Enlargement absent, Nodularity absent  Uterus:  Size normal, Tenderness absent  Adenexa: Masses absent, Tenderness absent  Anus/Perineum:  Lesions absent and Masses absent                                                             Assessment and Plan          Diagnosis Orders   1. Encounter for annual routine gynecological examination        2.  Screening mammogram, encounter for  [facility] AMILCAR DIGITAL SCREEN BILATERAL          patient is going to endocrinologist in March for her thyroid and osteoporosis      I am having Moises Weiss maintain her levothyroxine, ALPRAZolam, Biotin, fish oil, pravastatin, vitamin C, montelukast, albuterol sulfate HFA, Synthroid, and ursodiol. Return in about 1 year (around 1/5/2024) for yearly. She was also counseled on her preventative health maintenance recommendations and follow-up. There are no Patient Instructions on file for this visit.     CORI Logan CNM,1/5/2023 10:17 AM

## 2023-01-08 ASSESSMENT — ENCOUNTER SYMPTOMS
SHORTNESS OF BREATH: 0
BACK PAIN: 0
ABDOMINAL PAIN: 0

## 2023-05-24 ENCOUNTER — HOSPITAL ENCOUNTER (OUTPATIENT)
Dept: WOMENS IMAGING | Age: 63
Discharge: HOME | End: 2023-05-26
Payer: COMMERCIAL

## 2023-05-24 DIAGNOSIS — Z12.31 SCREENING MAMMOGRAM, ENCOUNTER FOR: ICD-10-CM

## 2023-05-24 PROCEDURE — 77063 BREAST TOMOSYNTHESIS BI: CPT

## 2023-12-08 ENCOUNTER — OFFICE VISIT (OUTPATIENT)
Dept: PRIMARY CARE CLINIC | Age: 63
End: 2023-12-08
Payer: COMMERCIAL

## 2023-12-08 VITALS
OXYGEN SATURATION: 99 % | HEIGHT: 65 IN | HEART RATE: 72 BPM | DIASTOLIC BLOOD PRESSURE: 82 MMHG | SYSTOLIC BLOOD PRESSURE: 116 MMHG | BODY MASS INDEX: 24.79 KG/M2 | WEIGHT: 148.8 LBS

## 2023-12-08 DIAGNOSIS — K74.3 PRIMARY BILIARY CHOLANGITIS (HCC): ICD-10-CM

## 2023-12-08 DIAGNOSIS — E03.8 OTHER SPECIFIED HYPOTHYROIDISM: Primary | ICD-10-CM

## 2023-12-08 DIAGNOSIS — F41.9 ANXIETY: ICD-10-CM

## 2023-12-08 DIAGNOSIS — E78.2 MIXED HYPERLIPIDEMIA: ICD-10-CM

## 2023-12-08 PROCEDURE — G8427 DOCREV CUR MEDS BY ELIG CLIN: HCPCS | Performed by: FAMILY MEDICINE

## 2023-12-08 PROCEDURE — 3017F COLORECTAL CA SCREEN DOC REV: CPT | Performed by: FAMILY MEDICINE

## 2023-12-08 PROCEDURE — 99204 OFFICE O/P NEW MOD 45 MIN: CPT | Performed by: FAMILY MEDICINE

## 2023-12-08 PROCEDURE — G8484 FLU IMMUNIZE NO ADMIN: HCPCS | Performed by: FAMILY MEDICINE

## 2023-12-08 PROCEDURE — 1036F TOBACCO NON-USER: CPT | Performed by: FAMILY MEDICINE

## 2023-12-08 PROCEDURE — G8420 CALC BMI NORM PARAMETERS: HCPCS | Performed by: FAMILY MEDICINE

## 2023-12-08 RX ORDER — ALENDRONATE SODIUM 70 MG/1
70 TABLET ORAL WEEKLY
COMMUNITY
Start: 2023-11-16

## 2023-12-08 RX ORDER — LEVOTHYROXINE SODIUM 0.15 MG/1
150 TABLET ORAL DAILY
Qty: 90 TABLET | Refills: 1 | Status: SHIPPED | OUTPATIENT
Start: 2023-12-08 | End: 2024-06-05

## 2023-12-08 SDOH — ECONOMIC STABILITY: INCOME INSECURITY: HOW HARD IS IT FOR YOU TO PAY FOR THE VERY BASICS LIKE FOOD, HOUSING, MEDICAL CARE, AND HEATING?: NOT HARD AT ALL

## 2023-12-08 SDOH — ECONOMIC STABILITY: HOUSING INSECURITY
IN THE LAST 12 MONTHS, WAS THERE A TIME WHEN YOU DID NOT HAVE A STEADY PLACE TO SLEEP OR SLEPT IN A SHELTER (INCLUDING NOW)?: NO

## 2023-12-08 SDOH — ECONOMIC STABILITY: FOOD INSECURITY: WITHIN THE PAST 12 MONTHS, THE FOOD YOU BOUGHT JUST DIDN'T LAST AND YOU DIDN'T HAVE MONEY TO GET MORE.: NEVER TRUE

## 2023-12-08 SDOH — ECONOMIC STABILITY: FOOD INSECURITY: WITHIN THE PAST 12 MONTHS, YOU WORRIED THAT YOUR FOOD WOULD RUN OUT BEFORE YOU GOT MONEY TO BUY MORE.: NEVER TRUE

## 2023-12-08 ASSESSMENT — PATIENT HEALTH QUESTIONNAIRE - PHQ9
2. FEELING DOWN, DEPRESSED OR HOPELESS: 0
SUM OF ALL RESPONSES TO PHQ9 QUESTIONS 1 & 2: 0
SUM OF ALL RESPONSES TO PHQ QUESTIONS 1-9: 0
1. LITTLE INTEREST OR PLEASURE IN DOING THINGS: 0

## 2023-12-08 NOTE — PROGRESS NOTES
[facility] Primary Care      Patient:  Derick Presley 61 y.o. female     Date of Service: 12/8/23      Chief Complaint:   Chief Complaint   Patient presents with    New Patient     Patient has some anxiety concerns         History of Present Illness     Concerns of anxiety ongoing for the past several years. Was previously maintained on pharmacological therapy including Zoloft had some symptomatic relief from the medication. Not currently following with any outpatient psychiatric care/provider. Not currently following with any outpatient counseling/CBT. Does have racing thoughts at night when trying to sleep and occasionally will sleep due to anxiety concerns. Also history of primary biliary cholangitis, currently follows with gastroenterology in [place]  At this time. Currently maintained on ursodiol. Reviewed recent studies and LFTs. History hypothyroidism and currently maintained on levothyroxine 150 mcg daily except Thursday. Following medication well. No noted hyper/hyperthyroid symptoms at this time. History of hyperlipidemia in the past however given that she has primary biliary cholangitis was recommended to stay from taking a statin medication. Does exercise regularly and is physically active. Allergies:    Patient has no known allergies. Medication List:    Current Outpatient Medications   Medication Sig Dispense Refill    alendronate (FOSAMAX) 70 MG tablet Take 1 tablet by mouth Once a week at 5 PM      levothyroxine (SYNTHROID) 150 MCG tablet Take 1 tablet by mouth Daily 90 tablet 1    ursodiol (ACTIGALL) 500 MG tablet       montelukast (SINGULAIR) 10 MG tablet       Ascorbic Acid (VITAMIN C) 250 MG tablet Take 1 tablet by mouth daily      Biotin 5000 MCG CAPS Take 1 capsule by mouth daily      Omega-3 Fatty Acids (FISH OIL) 1000 MG CAPS Take 2 capsules by mouth daily       No current facility-administered medications for this visit.           Review of Systems

## 2024-01-10 ENCOUNTER — OFFICE VISIT (OUTPATIENT)
Dept: OBGYN | Age: 64
End: 2024-01-10
Payer: COMMERCIAL

## 2024-01-10 ENCOUNTER — HOSPITAL ENCOUNTER (OUTPATIENT)
Age: 64
Setting detail: SPECIMEN
Discharge: HOME | End: 2024-01-10
Payer: COMMERCIAL

## 2024-01-10 VITALS
BODY MASS INDEX: 24.49 KG/M2 | SYSTOLIC BLOOD PRESSURE: 120 MMHG | HEIGHT: 65 IN | DIASTOLIC BLOOD PRESSURE: 68 MMHG | WEIGHT: 147 LBS

## 2024-01-10 DIAGNOSIS — Z01.419 ENCOUNTER FOR ANNUAL ROUTINE GYNECOLOGICAL EXAMINATION: Primary | ICD-10-CM

## 2024-01-10 DIAGNOSIS — Z12.4 SCREENING FOR MALIGNANT NEOPLASM OF CERVIX: ICD-10-CM

## 2024-01-10 DIAGNOSIS — Z12.31 SCREENING MAMMOGRAM, ENCOUNTER FOR: ICD-10-CM

## 2024-01-10 LAB
ALBUMIN SERPL-MCNC: NORMAL G/DL
ALP BLD-CCNC: NORMAL U/L
ALT SERPL-CCNC: NORMAL U/L
ANION GAP SERPL CALCULATED.3IONS-SCNC: NORMAL MMOL/L
AST SERPL-CCNC: NORMAL U/L
BILIRUB SERPL-MCNC: NORMAL MG/DL
BUN BLDV-MCNC: NORMAL MG/DL
CALCIUM SERPL-MCNC: NORMAL MG/DL
CHLORIDE BLD-SCNC: NORMAL MMOL/L
CHOLESTEROL, TOTAL: NORMAL
CHOLESTEROL/HDL RATIO: NORMAL
CO2: NORMAL
CREAT SERPL-MCNC: NORMAL MG/DL
EGFR: NORMAL
GLUCOSE BLD-MCNC: NORMAL MG/DL
HDLC SERPL-MCNC: NORMAL MG/DL
LDL CHOLESTEROL CALCULATED: NORMAL
NONHDLC SERPL-MCNC: NORMAL MG/DL
POTASSIUM SERPL-SCNC: NORMAL MMOL/L
SODIUM BLD-SCNC: NORMAL MMOL/L
TOTAL PROTEIN: NORMAL
TRIGL SERPL-MCNC: NORMAL MG/DL
VLDLC SERPL CALC-MCNC: NORMAL MG/DL

## 2024-01-10 PROCEDURE — G8484 FLU IMMUNIZE NO ADMIN: HCPCS | Performed by: ADVANCED PRACTICE MIDWIFE

## 2024-01-10 PROCEDURE — 99396 PREV VISIT EST AGE 40-64: CPT | Performed by: ADVANCED PRACTICE MIDWIFE

## 2024-01-10 PROCEDURE — G0145 SCR C/V CYTO,THINLAYER,RESCR: HCPCS

## 2024-01-10 ASSESSMENT — ENCOUNTER SYMPTOMS
ABDOMINAL PAIN: 0
BACK PAIN: 0
SHORTNESS OF BREATH: 0

## 2024-01-10 NOTE — PROGRESS NOTES
Barb MARCELO    HPI: here for annual gyn exam, denies c/o    Review of Systems   Constitutional: Negative.    HENT:  Negative for congestion.    Respiratory:  Negative for shortness of breath.    Cardiovascular:  Negative for chest pain.   Gastrointestinal:  Negative for abdominal pain.   Genitourinary:  Negative for dysuria.   Musculoskeletal:  Negative for back pain.   Skin:  Negative for rash.   Neurological:  Negative for headaches.   Psychiatric/Behavioral:  The patient is not nervous/anxious.          Objective  Lymphatic:   no lymphadenopathy  Heent:   negative   Cor: regular rate and rhythm, no murmurs              Pul:clear to auscultation bilaterally- no wheezes, rales or rhonchi, normal air movement, no respiratory distress      GI: Abdomen soft, non-tender. BS normal. No masses,  No organomegaly           Extremities: normal strength, tone, and muscle mass   Breasts: Breast:normal appearance, no masses or tenderness   Pelvic Exam: GENITAL/URINARY:  External Genitalia:  General appearance; normal, Hair distribution; normal, Lesions absent  Urethral Meatus:  Size normal, Location normal, Lesions absent, Prolapse absent  Urethra:  Fullness absent, Masses absent  Bladder:  Fullness absent, Masses absent, Tenderness absent, Cystocele absent  Vagina:  General appearance normal, Estrogen effect normal, Discharge absent, Lesions absent, Pelvic support normal  Cervix:  General appearance normal, Lesions absent, Discharge absent, Tenderness absent, Enlargement absent, Nodularity absent  Uterus:  Size normal, Tenderness absent  Adenexa:  Masses absent, Tenderness absent  Anus/Perineum:  Lesions absent and Masses absent                                                    Assessment and Plan          Diagnosis Orders   1. Encounter for annual routine gynecological examination        2. Screening for malignant neoplasm of cervix  PAP SMEAR      3. Screening mammogram, encounter for  [facility] AMILCAR DIGITAL SCREEN BILATERAL

## 2024-01-19 ENCOUNTER — OFFICE VISIT (OUTPATIENT)
Dept: PRIMARY CARE CLINIC | Age: 64
End: 2024-01-19
Payer: COMMERCIAL

## 2024-01-19 VITALS
BODY MASS INDEX: 25.16 KG/M2 | HEART RATE: 74 BPM | HEIGHT: 65 IN | WEIGHT: 151 LBS | SYSTOLIC BLOOD PRESSURE: 122 MMHG | DIASTOLIC BLOOD PRESSURE: 80 MMHG | RESPIRATION RATE: 16 BRPM

## 2024-01-19 DIAGNOSIS — E03.8 OTHER SPECIFIED HYPOTHYROIDISM: ICD-10-CM

## 2024-01-19 DIAGNOSIS — E78.2 MIXED HYPERLIPIDEMIA: ICD-10-CM

## 2024-01-19 DIAGNOSIS — F41.9 ANXIETY: Primary | ICD-10-CM

## 2024-01-19 PROCEDURE — 1036F TOBACCO NON-USER: CPT | Performed by: FAMILY MEDICINE

## 2024-01-19 PROCEDURE — 99214 OFFICE O/P EST MOD 30 MIN: CPT | Performed by: FAMILY MEDICINE

## 2024-01-19 PROCEDURE — G8427 DOCREV CUR MEDS BY ELIG CLIN: HCPCS | Performed by: FAMILY MEDICINE

## 2024-01-19 PROCEDURE — 3017F COLORECTAL CA SCREEN DOC REV: CPT | Performed by: FAMILY MEDICINE

## 2024-01-19 PROCEDURE — G8484 FLU IMMUNIZE NO ADMIN: HCPCS | Performed by: FAMILY MEDICINE

## 2024-01-19 PROCEDURE — G8419 CALC BMI OUT NRM PARAM NOF/U: HCPCS | Performed by: FAMILY MEDICINE

## 2024-01-19 RX ORDER — MONTELUKAST SODIUM 10 MG/1
10 TABLET ORAL NIGHTLY
Qty: 90 TABLET | Refills: 3 | Status: SHIPPED | OUTPATIENT
Start: 2024-01-19

## 2024-01-19 ASSESSMENT — PATIENT HEALTH QUESTIONNAIRE - PHQ9
SUM OF ALL RESPONSES TO PHQ9 QUESTIONS 1 & 2: 0
SUM OF ALL RESPONSES TO PHQ QUESTIONS 1-9: 0
SUM OF ALL RESPONSES TO PHQ QUESTIONS 1-9: 0
1. LITTLE INTEREST OR PLEASURE IN DOING THINGS: 0
SUM OF ALL RESPONSES TO PHQ QUESTIONS 1-9: 0
SUM OF ALL RESPONSES TO PHQ QUESTIONS 1-9: 0
2. FEELING DOWN, DEPRESSED OR HOPELESS: 0

## 2024-01-19 NOTE — PROGRESS NOTES
[facility] Primary [facility]      Patient:  Lindsay Newton 63 y.o. female     Date of Service: 12/8/23      Chief Complaint:   Chief Complaint   Patient presents with    Anxiety     -patient is here for anxiety follow up.Patient state she feels better. Voices no complaints at this time.         History of Present Illness     Fu on concerns of anxiety ongoing for the past several years.  Feels improved at this time compared to previous evaluation.     Was previously maintained on pharmacological therapy including Zoloft had some symptomatic relief from the medication.  Not currently following with any outpatient psychiatric care/provider.  Not currently following with any outpatient counseling/CBT.     Also history of primary biliary cholangitis, currently follows with gastroenterology in [place]  At this time.  Currently maintained on ursodiol.  Reviewed recent studies and LFTs.    History hypothyroidism and currently maintained on levothyroxine 150 mcg daily except Thursday.  Doing well on the medication.  No noted hyper/hyperthyroid symptoms at this time. Reviewed tsh and WNL    History of hyperlipidemia in the past however given that she has primary biliary cholangitis was recommended to stay from taking a statin medication.  Does exercise regularly and is physically active.        Allergies:    Patient has no known allergies.    Medication List:    Current Outpatient Medications   Medication Sig Dispense Refill    Cholecalciferol (VITAMIN D3) 25 MCG (1000 UT) CAPS       montelukast (SINGULAIR) 10 MG tablet Take 1 tablet by mouth nightly 90 tablet 3    alendronate (FOSAMAX) 70 MG tablet Take 1 tablet by mouth Once a week at 5 PM      levothyroxine (SYNTHROID) 150 MCG tablet Take 1 tablet by mouth Daily 90 tablet 1    ursodiol (ACTIGALL) 500 MG tablet       Ascorbic Acid (VITAMIN C) 250 MG tablet Take 1 tablet by mouth daily      Biotin 5000 MCG CAPS Take 1 capsule by mouth daily      Omega-3 Fatty Acids

## 2024-01-26 LAB — CYTOLOGY REPORT: NORMAL

## 2024-04-10 RX ORDER — ALENDRONATE SODIUM 70 MG/1
70 TABLET ORAL WEEKLY
Qty: 4 TABLET | Refills: 3 | Status: SHIPPED | OUTPATIENT
Start: 2024-04-10

## 2024-05-07 PROBLEM — E07.9 THYROID DISEASE: Status: ACTIVE | Noted: 2024-05-07

## 2024-05-07 PROBLEM — K76.9 LIVER DISEASE: Status: ACTIVE | Noted: 2024-05-07

## 2024-05-07 PROBLEM — E78.00 HIGH CHOLESTEROL: Status: ACTIVE | Noted: 2024-05-07

## 2024-05-07 PROBLEM — M81.0 OSTEOPOROSIS: Status: ACTIVE | Noted: 2024-05-07

## 2024-05-07 PROBLEM — E03.9 ACQUIRED HYPOTHYROIDISM: Status: ACTIVE | Noted: 2024-05-07

## 2024-05-07 PROBLEM — K74.3 PRIMARY BILIARY CHOLANGITIS (HCC): Status: ACTIVE | Noted: 2024-05-07

## 2024-07-29 ENCOUNTER — HOSPITAL ENCOUNTER (OUTPATIENT)
Dept: WOMENS IMAGING | Age: 64
Discharge: HOME | End: 2024-07-31
Attending: ADVANCED PRACTICE MIDWIFE
Payer: COMMERCIAL

## 2024-07-29 DIAGNOSIS — Z12.31 SCREENING MAMMOGRAM, ENCOUNTER FOR: ICD-10-CM

## 2024-07-29 PROCEDURE — 77063 BREAST TOMOSYNTHESIS BI: CPT

## 2024-08-05 RX ORDER — ALENDRONATE SODIUM 70 MG/1
70 TABLET ORAL WEEKLY
Qty: 4 TABLET | Refills: 3 | Status: SHIPPED | OUTPATIENT
Start: 2024-08-05

## 2024-11-22 ENCOUNTER — PATIENT MESSAGE (OUTPATIENT)
Dept: PRIMARY CARE CLINIC | Age: 64
End: 2024-11-22

## 2024-11-25 RX ORDER — ALENDRONATE SODIUM 70 MG/1
70 TABLET ORAL WEEKLY
Qty: 4 TABLET | Refills: 3 | Status: SHIPPED | OUTPATIENT
Start: 2024-11-25

## 2025-01-02 RX ORDER — MONTELUKAST SODIUM 10 MG/1
10 TABLET ORAL NIGHTLY
Qty: 90 TABLET | Refills: 3 | Status: SHIPPED | OUTPATIENT
Start: 2025-01-02

## 2025-01-13 RX ORDER — MONTELUKAST SODIUM 10 MG/1
10 TABLET ORAL NIGHTLY
Qty: 90 TABLET | Refills: 3 | Status: SHIPPED | OUTPATIENT
Start: 2025-01-13

## 2025-01-14 ENCOUNTER — OFFICE VISIT (OUTPATIENT)
Dept: OBGYN | Age: 65
End: 2025-01-14
Payer: COMMERCIAL

## 2025-01-14 VITALS
BODY MASS INDEX: 25.19 KG/M2 | WEIGHT: 151.2 LBS | HEIGHT: 65 IN | SYSTOLIC BLOOD PRESSURE: 118 MMHG | DIASTOLIC BLOOD PRESSURE: 78 MMHG

## 2025-01-14 DIAGNOSIS — Z01.419 ENCOUNTER FOR ANNUAL ROUTINE GYNECOLOGICAL EXAMINATION: Primary | ICD-10-CM

## 2025-01-14 DIAGNOSIS — Z12.31 SCREENING MAMMOGRAM, ENCOUNTER FOR: ICD-10-CM

## 2025-01-14 PROCEDURE — 99396 PREV VISIT EST AGE 40-64: CPT | Performed by: ADVANCED PRACTICE MIDWIFE

## 2025-01-14 SDOH — ECONOMIC STABILITY: FOOD INSECURITY: WITHIN THE PAST 12 MONTHS, YOU WORRIED THAT YOUR FOOD WOULD RUN OUT BEFORE YOU GOT MONEY TO BUY MORE.: NEVER TRUE

## 2025-01-14 SDOH — ECONOMIC STABILITY: FOOD INSECURITY: WITHIN THE PAST 12 MONTHS, THE FOOD YOU BOUGHT JUST DIDN'T LAST AND YOU DIDN'T HAVE MONEY TO GET MORE.: NEVER TRUE

## 2025-01-14 ASSESSMENT — PATIENT HEALTH QUESTIONNAIRE - PHQ9
SUM OF ALL RESPONSES TO PHQ QUESTIONS 1-9: 0
1. LITTLE INTEREST OR PLEASURE IN DOING THINGS: NOT AT ALL
2. FEELING DOWN, DEPRESSED OR HOPELESS: NOT AT ALL
SUM OF ALL RESPONSES TO PHQ QUESTIONS 1-9: 0
SUM OF ALL RESPONSES TO PHQ9 QUESTIONS 1 & 2: 0
SUM OF ALL RESPONSES TO PHQ QUESTIONS 1-9: 0
SUM OF ALL RESPONSES TO PHQ QUESTIONS 1-9: 0

## 2025-01-14 ASSESSMENT — ENCOUNTER SYMPTOMS
SHORTNESS OF BREATH: 0
ABDOMINAL PAIN: 0
BACK PAIN: 0

## 2025-01-14 NOTE — PROGRESS NOTES
YEARLY PHYSICAL    Date of service: 2025    Lindsay Newton  Is a 64 y.o.  , female    PT's PCP is: Manuel Monteiro MD     : 1960                                             Subjective:       No LMP recorded. Patient is postmenopausal.     Are your menses regular: not applicable    OB History    Para Term  AB Living   1 1 1     1   SAB IAB Ectopic Molar Multiple Live Births                    # Outcome Date GA Lbr Dayday/2nd Weight Sex Type Anes PTL Lv   1 Term                 Social History     Tobacco Use   Smoking Status Former    Current packs/day: 0.00    Average packs/day: 1 pack/day for 27.0 years (27.0 ttl pk-yrs)    Types: Cigarettes    Start date: 1975    Quit date: 1985    Years since quittin.0   Smokeless Tobacco Never        Social History     Substance and Sexual Activity   Alcohol Use Yes    Comment: social       Family History   Problem Relation Age of Onset    Other Mother         liver disease    Cancer Father         bladder    Heart Failure Father     Other Sister         WPW    Other Other         No family h/o ovarian or breast cancer .No family h/O DVT.        Any family history of breast or ovarian cancer: No    Any family history of blood clots: No    Allergies: Patient has no known allergies.      Current Outpatient Medications:     montelukast (SINGULAIR) 10 MG tablet, Take 1 tablet by mouth nightly, Disp: 90 tablet, Rfl: 3    alendronate (FOSAMAX) 70 MG tablet, Take 1 tablet by mouth Once a week at 5 PM, Disp: 4 tablet, Rfl: 3    levothyroxine (SYNTHROID) 150 MCG tablet, Pill 5 days a week (daily except skip  and Sundays) (Patient taking differently: Pill 6 days a week (daily except skip   Sundays)), Disp: 90 tablet, Rfl: 1    Cholecalciferol (VITAMIN D3) 25 MCG (1000 UT) CAPS, Take 50 mcg by mouth daily, Disp: , Rfl:     ursodiol (ACTIGALL) 500 MG tablet, , Disp:

## 2025-03-10 ENCOUNTER — OFFICE VISIT (OUTPATIENT)
Dept: PRIMARY CARE CLINIC | Age: 65
End: 2025-03-10
Payer: MEDICARE

## 2025-03-10 VITALS
DIASTOLIC BLOOD PRESSURE: 78 MMHG | HEART RATE: 67 BPM | HEIGHT: 65 IN | OXYGEN SATURATION: 99 % | BODY MASS INDEX: 24.96 KG/M2 | SYSTOLIC BLOOD PRESSURE: 118 MMHG | WEIGHT: 149.8 LBS

## 2025-03-10 DIAGNOSIS — Z11.59 NEED FOR HEPATITIS C SCREENING TEST: ICD-10-CM

## 2025-03-10 DIAGNOSIS — Z11.4 SCREENING FOR HIV WITHOUT PRESENCE OF RISK FACTORS: ICD-10-CM

## 2025-03-10 DIAGNOSIS — J45.21 MILD INTERMITTENT ASTHMA WITH ACUTE EXACERBATION: Chronic | ICD-10-CM

## 2025-03-10 DIAGNOSIS — K74.3 PRIMARY BILIARY CHOLANGITIS (HCC): ICD-10-CM

## 2025-03-10 DIAGNOSIS — E03.9 ACQUIRED HYPOTHYROIDISM: ICD-10-CM

## 2025-03-10 DIAGNOSIS — Z00.00 WELCOME TO MEDICARE PREVENTIVE VISIT: Primary | ICD-10-CM

## 2025-03-10 DIAGNOSIS — E78.2 MIXED HYPERLIPIDEMIA: ICD-10-CM

## 2025-03-10 PROCEDURE — 99211 OFF/OP EST MAY X REQ PHY/QHP: CPT | Performed by: FAMILY MEDICINE

## 2025-03-10 SDOH — ECONOMIC STABILITY: FOOD INSECURITY: WITHIN THE PAST 12 MONTHS, YOU WORRIED THAT YOUR FOOD WOULD RUN OUT BEFORE YOU GOT MONEY TO BUY MORE.: NEVER TRUE

## 2025-03-10 SDOH — ECONOMIC STABILITY: FOOD INSECURITY: WITHIN THE PAST 12 MONTHS, THE FOOD YOU BOUGHT JUST DIDN'T LAST AND YOU DIDN'T HAVE MONEY TO GET MORE.: NEVER TRUE

## 2025-03-10 ASSESSMENT — LIFESTYLE VARIABLES
HOW OFTEN DO YOU HAVE A DRINK CONTAINING ALCOHOL: 2-4 TIMES A MONTH
HOW MANY STANDARD DRINKS CONTAINING ALCOHOL DO YOU HAVE ON A TYPICAL DAY: 1 OR 2

## 2025-03-10 ASSESSMENT — PATIENT HEALTH QUESTIONNAIRE - PHQ9
SUM OF ALL RESPONSES TO PHQ QUESTIONS 1-9: 0
2. FEELING DOWN, DEPRESSED OR HOPELESS: NOT AT ALL
1. LITTLE INTEREST OR PLEASURE IN DOING THINGS: NOT AT ALL
SUM OF ALL RESPONSES TO PHQ QUESTIONS 1-9: 0

## 2025-03-10 NOTE — PROGRESS NOTES
Medicare Annual Wellness Visit    Lindsay Newton is here for Medicare AWV (Mini cog-5)    Assessment & Plan   Welcome to Medicare preventive visit  Mild intermittent asthma with acute exacerbation  Acquired hypothyroidism  -     Basic Metabolic Panel; Future  -     TSH reflex to FT4; Future  Primary biliary cholangitis (HCC)  -     Basic Metabolic Panel; Future  Mixed hyperlipidemia  -     Basic Metabolic Panel; Future  -     Lipid Panel; Future  Screening for HIV without presence of risk factors  -     HIV Screen; Future  Need for hepatitis C screening test  -     Hepatitis C Antibody; Future    History of primary biliary cirrhosis, stable at this time, continue ursodiol and follow-up with gastroenterology.    History of hypothyroidism is stable at this time, continue levothyroxine.  Follow-up labs and check TSH.  Adjust medications as needed    History of asthma stable at this time, baseline respiratory status and not currently using any inhalers.  CPM.    Continue to target regular lifestyle modifications including incorporation of Mediterranean diet, healthy lifestyle choices and regular physical activity.    Follow-up with dental, optometry on a regular basis for routine examinations.  No concerns of hearing loss at this time.    We discussed the differences between CODE STATUS, living will and advanced directive.  Patient was explained the differences between full code, limited code, DNR CC and DNR CCA.  Patient was advised to follow-up with ACP specialist or with their own  in regard to estate planning/will planning    The patient's cognitive screening is intact and she obtained 5 points, clock is 4/4 and 5-minute recall 3/3       Return in about 6 months (around 9/10/2025) for Follow up medications.     Subjective   The following acute and/or chronic problems were also addressed today:  As above    Patient's complete Health Risk Assessment and screening values have been reviewed and are found in

## 2025-03-11 ENCOUNTER — RESULTS FOLLOW-UP (OUTPATIENT)
Dept: PRIMARY CARE CLINIC | Age: 65
End: 2025-03-11

## 2025-03-11 LAB
ANION GAP SERPL CALCULATED.3IONS-SCNC: 11 MMOL/L (ref 7–16)
BUN BLDV-MCNC: 15 MG/DL (ref 8–23)
CALCIUM SERPL-MCNC: 8.8 MG/DL (ref 8.6–10.5)
CHLORIDE BLD-SCNC: 104 MMOL/L (ref 96–107)
CHOLESTEROL, TOTAL: 218 MG/DL (ref 100–199)
CHOLESTEROL/HDL RATIO: 4 (ref 2–4.5)
CO2: 28 MMOL/L (ref 18–32)
CREAT SERPL-MCNC: 0.85 MG/DL (ref 0.51–1.15)
EGFR IF NONAFRICAN AMERICAN: 76 ML/MIN/1.73M2
GLUCOSE: 91 MG/DL (ref 70–100)
HDLC SERPL-MCNC: 54 MG/DL
HEPATITIS C ANTIBODY: NEGATIVE
HIV 1,2 COMBO ANTIGEN/ANTIBODY: NONREACTIVE
HIV 1/2 ANTIBODY: NONREACTIVE
HIV INTERPRETATION: NORMAL
HIV-1 P24 AG: NONREACTIVE
LDL CHOLESTEROL: 141 MG/DL
LDL/HDL RATIO: 2.6
POTASSIUM SERPL-SCNC: 4.5 MMOL/L (ref 3.5–5.4)
SODIUM BLD-SCNC: 143 MMOL/L (ref 135–148)
TRIGL SERPL-MCNC: 116 MG/DL (ref 20–149)
TSH SERPL DL<=0.05 MIU/L-ACNC: 1.77 UIU/ML (ref 0.27–4.2)
VLDLC SERPL CALC-MCNC: 23 MG/DL

## 2025-03-13 RX ORDER — ALENDRONATE SODIUM 70 MG/1
70 TABLET ORAL WEEKLY
Qty: 4 TABLET | Refills: 3 | Status: SHIPPED | OUTPATIENT
Start: 2025-03-13

## 2025-05-03 ENCOUNTER — HOSPITAL ENCOUNTER (INPATIENT)
Age: 65
LOS: 1 days | Discharge: HOME | DRG: 390 | End: 2025-05-05
Attending: STUDENT IN AN ORGANIZED HEALTH CARE EDUCATION/TRAINING PROGRAM | Admitting: STUDENT IN AN ORGANIZED HEALTH CARE EDUCATION/TRAINING PROGRAM
Payer: MEDICARE

## 2025-05-03 ENCOUNTER — APPOINTMENT (OUTPATIENT)
Dept: CT IMAGING | Age: 65
DRG: 390 | End: 2025-05-03
Payer: MEDICARE

## 2025-05-03 ENCOUNTER — APPOINTMENT (OUTPATIENT)
Dept: GENERAL RADIOLOGY | Age: 65
DRG: 390 | End: 2025-05-03
Payer: MEDICARE

## 2025-05-03 DIAGNOSIS — K56.600 PARTIAL SMALL BOWEL OBSTRUCTION (HCC): Primary | ICD-10-CM

## 2025-05-03 LAB
ALBUMIN SERPL-MCNC: 4.3 G/DL (ref 3.5–5.2)
ALBUMIN/GLOB SERPL: 1.2 {RATIO} (ref 1–2.5)
ALP SERPL-CCNC: 79 U/L (ref 35–104)
ALT SERPL-CCNC: 35 U/L (ref 10–35)
ANION GAP SERPL CALCULATED.3IONS-SCNC: 13 MMOL/L (ref 9–16)
AST SERPL-CCNC: 35 U/L (ref 10–35)
BASOPHILS # BLD: 0.08 K/UL (ref 0–0.2)
BASOPHILS NFR BLD: 1 % (ref 0–2)
BILIRUB SERPL-MCNC: 0.3 MG/DL (ref 0–1.2)
BUN SERPL-MCNC: 11 MG/DL (ref 8–23)
BUN/CREAT SERPL: 14 (ref 9–20)
CALCIUM SERPL-MCNC: 9.3 MG/DL (ref 8.6–10.4)
CHLORIDE SERPL-SCNC: 102 MMOL/L (ref 98–107)
CO2 SERPL-SCNC: 23 MMOL/L (ref 20–31)
CREAT SERPL-MCNC: 0.8 MG/DL (ref 0.5–0.9)
EOSINOPHIL # BLD: 0.09 K/UL (ref 0–0.44)
EOSINOPHILS RELATIVE PERCENT: 1 % (ref 1–4)
ERYTHROCYTE [DISTWIDTH] IN BLOOD BY AUTOMATED COUNT: 12.9 % (ref 11.8–14.4)
GFR, ESTIMATED: 85 ML/MIN/1.73M2
GLUCOSE SERPL-MCNC: 121 MG/DL (ref 74–99)
HCT VFR BLD AUTO: 39.7 % (ref 36.3–47.1)
HGB BLD-MCNC: 13.2 G/DL (ref 11.9–15.1)
IMM GRANULOCYTES # BLD AUTO: 0.03 K/UL (ref 0–0.3)
IMM GRANULOCYTES NFR BLD: 0 %
LACTATE BLDV-SCNC: 1.5 MMOL/L (ref 0.5–2.2)
LIPASE SERPL-CCNC: 24 U/L (ref 13–60)
LYMPHOCYTES NFR BLD: 2.97 K/UL (ref 1.1–3.7)
LYMPHOCYTES RELATIVE PERCENT: 29 % (ref 24–43)
MCH RBC QN AUTO: 28.8 PG (ref 25.2–33.5)
MCHC RBC AUTO-ENTMCNC: 33.2 G/DL (ref 28.4–34.8)
MCV RBC AUTO: 86.5 FL (ref 82.6–102.9)
MONOCYTES NFR BLD: 0.81 K/UL (ref 0.1–1.2)
MONOCYTES NFR BLD: 8 % (ref 3–12)
NEUTROPHILS NFR BLD: 61 % (ref 36–65)
NEUTS SEG NFR BLD: 6.18 K/UL (ref 1.5–8.1)
NRBC BLD-RTO: 0 PER 100 WBC
PLATELET # BLD AUTO: 326 K/UL (ref 138–453)
PMV BLD AUTO: 8.5 FL (ref 8.1–13.5)
POTASSIUM SERPL-SCNC: 3.8 MMOL/L (ref 3.7–5.3)
PROT SERPL-MCNC: 7.8 G/DL (ref 6.6–8.7)
RBC # BLD AUTO: 4.59 M/UL (ref 3.95–5.11)
SODIUM SERPL-SCNC: 138 MMOL/L (ref 136–145)
TROPONIN I SERPL HS-MCNC: <6 NG/L (ref 0–14)
WBC OTHER # BLD: 10.2 K/UL (ref 3.5–11.3)

## 2025-05-03 PROCEDURE — 6360000002 HC RX W HCPCS: Performed by: STUDENT IN AN ORGANIZED HEALTH CARE EDUCATION/TRAINING PROGRAM

## 2025-05-03 PROCEDURE — 85025 COMPLETE CBC W/AUTO DIFF WBC: CPT

## 2025-05-03 PROCEDURE — 71045 X-RAY EXAM CHEST 1 VIEW: CPT

## 2025-05-03 PROCEDURE — 74177 CT ABD & PELVIS W/CONTRAST: CPT

## 2025-05-03 PROCEDURE — 83605 ASSAY OF LACTIC ACID: CPT

## 2025-05-03 PROCEDURE — 83690 ASSAY OF LIPASE: CPT

## 2025-05-03 PROCEDURE — 99285 EMERGENCY DEPT VISIT HI MDM: CPT

## 2025-05-03 PROCEDURE — 2580000003 HC RX 258: Performed by: STUDENT IN AN ORGANIZED HEALTH CARE EDUCATION/TRAINING PROGRAM

## 2025-05-03 PROCEDURE — 84484 ASSAY OF TROPONIN QUANT: CPT

## 2025-05-03 PROCEDURE — 93005 ELECTROCARDIOGRAM TRACING: CPT | Performed by: STUDENT IN AN ORGANIZED HEALTH CARE EDUCATION/TRAINING PROGRAM

## 2025-05-03 PROCEDURE — 96375 TX/PRO/DX INJ NEW DRUG ADDON: CPT

## 2025-05-03 PROCEDURE — 96361 HYDRATE IV INFUSION ADD-ON: CPT

## 2025-05-03 PROCEDURE — 6360000004 HC RX CONTRAST MEDICATION: Performed by: STUDENT IN AN ORGANIZED HEALTH CARE EDUCATION/TRAINING PROGRAM

## 2025-05-03 PROCEDURE — 96374 THER/PROPH/DIAG INJ IV PUSH: CPT

## 2025-05-03 PROCEDURE — 80053 COMPREHEN METABOLIC PANEL: CPT

## 2025-05-03 RX ORDER — ONDANSETRON 2 MG/ML
4 INJECTION INTRAMUSCULAR; INTRAVENOUS ONCE
Status: COMPLETED | OUTPATIENT
Start: 2025-05-03 | End: 2025-05-03

## 2025-05-03 RX ORDER — 0.9 % SODIUM CHLORIDE 0.9 %
1000 INTRAVENOUS SOLUTION INTRAVENOUS ONCE
Status: COMPLETED | OUTPATIENT
Start: 2025-05-03 | End: 2025-05-04

## 2025-05-03 RX ORDER — IOPAMIDOL 755 MG/ML
75 INJECTION, SOLUTION INTRAVASCULAR
Status: COMPLETED | OUTPATIENT
Start: 2025-05-03 | End: 2025-05-03

## 2025-05-03 RX ADMIN — SODIUM CHLORIDE 1000 ML: 0.9 INJECTION, SOLUTION INTRAVENOUS at 23:10

## 2025-05-03 RX ADMIN — ONDANSETRON 4 MG: 2 INJECTION, SOLUTION INTRAMUSCULAR; INTRAVENOUS at 23:10

## 2025-05-03 RX ADMIN — IOPAMIDOL 75 ML: 755 INJECTION, SOLUTION INTRAVENOUS at 23:31

## 2025-05-03 RX ADMIN — FAMOTIDINE 20 MG: 10 INJECTION, SOLUTION INTRAVENOUS at 23:10

## 2025-05-04 ENCOUNTER — APPOINTMENT (OUTPATIENT)
Dept: GENERAL RADIOLOGY | Age: 65
DRG: 390 | End: 2025-05-04
Attending: INTERNAL MEDICINE
Payer: MEDICARE

## 2025-05-04 ENCOUNTER — APPOINTMENT (OUTPATIENT)
Dept: GENERAL RADIOLOGY | Age: 65
DRG: 390 | End: 2025-05-04
Payer: MEDICARE

## 2025-05-04 PROBLEM — K56.600 PARTIAL SMALL BOWEL OBSTRUCTION (HCC): Status: ACTIVE | Noted: 2025-05-04

## 2025-05-04 PROBLEM — K56.609 SMALL BOWEL OBSTRUCTION (HCC): Status: ACTIVE | Noted: 2025-05-04

## 2025-05-04 LAB
ANION GAP SERPL CALCULATED.3IONS-SCNC: 9 MMOL/L (ref 9–16)
BASOPHILS # BLD: 0.07 K/UL (ref 0–0.2)
BASOPHILS NFR BLD: 1 % (ref 0–2)
BUN SERPL-MCNC: 9 MG/DL (ref 8–23)
BUN/CREAT SERPL: 13 (ref 9–20)
CALCIUM SERPL-MCNC: 8.1 MG/DL (ref 8.6–10.4)
CHLORIDE SERPL-SCNC: 107 MMOL/L (ref 98–107)
CO2 SERPL-SCNC: 24 MMOL/L (ref 20–31)
CREAT SERPL-MCNC: 0.7 MG/DL (ref 0.5–0.9)
EKG ATRIAL RATE: 65 BPM
EKG P AXIS: 25 DEGREES
EKG P-R INTERVAL: 160 MS
EKG Q-T INTERVAL: 424 MS
EKG QRS DURATION: 72 MS
EKG QTC CALCULATION (BAZETT): 440 MS
EKG R AXIS: 24 DEGREES
EKG T AXIS: 59 DEGREES
EKG VENTRICULAR RATE: 65 BPM
EOSINOPHIL # BLD: 0.04 K/UL (ref 0–0.44)
EOSINOPHILS RELATIVE PERCENT: 1 % (ref 1–4)
ERYTHROCYTE [DISTWIDTH] IN BLOOD BY AUTOMATED COUNT: 13.1 % (ref 11.8–14.4)
GFR, ESTIMATED: >90 ML/MIN/1.73M2
GLUCOSE SERPL-MCNC: 99 MG/DL (ref 74–99)
HCT VFR BLD AUTO: 35.2 % (ref 36.3–47.1)
HGB BLD-MCNC: 11.6 G/DL (ref 11.9–15.1)
IMM GRANULOCYTES # BLD AUTO: <0.03 K/UL (ref 0–0.3)
IMM GRANULOCYTES NFR BLD: 0 %
LYMPHOCYTES NFR BLD: 1.55 K/UL (ref 1.1–3.7)
LYMPHOCYTES RELATIVE PERCENT: 21 % (ref 24–43)
MCH RBC QN AUTO: 28.7 PG (ref 25.2–33.5)
MCHC RBC AUTO-ENTMCNC: 33 G/DL (ref 28.4–34.8)
MCV RBC AUTO: 87.1 FL (ref 82.6–102.9)
MONOCYTES NFR BLD: 0.54 K/UL (ref 0.1–1.2)
MONOCYTES NFR BLD: 7 % (ref 3–12)
NEUTROPHILS NFR BLD: 70 % (ref 36–65)
NEUTS SEG NFR BLD: 5.27 K/UL (ref 1.5–8.1)
NRBC BLD-RTO: 0 PER 100 WBC
PLATELET # BLD AUTO: 260 K/UL (ref 138–453)
PMV BLD AUTO: 8.3 FL (ref 8.1–13.5)
POTASSIUM SERPL-SCNC: 3.8 MMOL/L (ref 3.7–5.3)
RBC # BLD AUTO: 4.04 M/UL (ref 3.95–5.11)
SODIUM SERPL-SCNC: 140 MMOL/L (ref 136–145)
WBC OTHER # BLD: 7.5 K/UL (ref 3.5–11.3)

## 2025-05-04 PROCEDURE — 36415 COLL VENOUS BLD VENIPUNCTURE: CPT

## 2025-05-04 PROCEDURE — 2580000003 HC RX 258: Performed by: INTERNAL MEDICINE

## 2025-05-04 PROCEDURE — 80048 BASIC METABOLIC PNL TOTAL CA: CPT

## 2025-05-04 PROCEDURE — 85025 COMPLETE CBC W/AUTO DIFF WBC: CPT

## 2025-05-04 PROCEDURE — 93010 ELECTROCARDIOGRAM REPORT: CPT | Performed by: INTERNAL MEDICINE

## 2025-05-04 PROCEDURE — 99232 SBSQ HOSP IP/OBS MODERATE 35: CPT | Performed by: SURGERY

## 2025-05-04 PROCEDURE — 6360000002 HC RX W HCPCS: Performed by: INTERNAL MEDICINE

## 2025-05-04 PROCEDURE — 74019 RADEX ABDOMEN 2 VIEWS: CPT

## 2025-05-04 PROCEDURE — 6370000000 HC RX 637 (ALT 250 FOR IP): Performed by: SURGERY

## 2025-05-04 PROCEDURE — 6370000000 HC RX 637 (ALT 250 FOR IP): Performed by: INTERNAL MEDICINE

## 2025-05-04 PROCEDURE — 94761 N-INVAS EAR/PLS OXIMETRY MLT: CPT

## 2025-05-04 PROCEDURE — 0D9670Z DRAINAGE OF STOMACH WITH DRAINAGE DEVICE, VIA NATURAL OR ARTIFICIAL OPENING: ICD-10-PCS | Performed by: PHYSICIAN ASSISTANT

## 2025-05-04 PROCEDURE — 6370000000 HC RX 637 (ALT 250 FOR IP): Performed by: STUDENT IN AN ORGANIZED HEALTH CARE EDUCATION/TRAINING PROGRAM

## 2025-05-04 PROCEDURE — 74018 RADEX ABDOMEN 1 VIEW: CPT

## 2025-05-04 PROCEDURE — 1200000000 HC SEMI PRIVATE

## 2025-05-04 PROCEDURE — 96361 HYDRATE IV INFUSION ADD-ON: CPT

## 2025-05-04 RX ORDER — SODIUM CHLORIDE 9 MG/ML
INJECTION, SOLUTION INTRAVENOUS PRN
Status: DISCONTINUED | OUTPATIENT
Start: 2025-05-04 | End: 2025-05-05 | Stop reason: HOSPADM

## 2025-05-04 RX ORDER — POLYETHYLENE GLYCOL 3350 17 G/17G
17 POWDER, FOR SOLUTION ORAL DAILY PRN
Status: DISCONTINUED | OUTPATIENT
Start: 2025-05-04 | End: 2025-05-05 | Stop reason: HOSPADM

## 2025-05-04 RX ORDER — ONDANSETRON 4 MG/1
4 TABLET, ORALLY DISINTEGRATING ORAL EVERY 8 HOURS PRN
Status: DISCONTINUED | OUTPATIENT
Start: 2025-05-04 | End: 2025-05-05 | Stop reason: HOSPADM

## 2025-05-04 RX ORDER — POTASSIUM CHLORIDE 1500 MG/1
40 TABLET, EXTENDED RELEASE ORAL PRN
Status: DISCONTINUED | OUTPATIENT
Start: 2025-05-04 | End: 2025-05-05 | Stop reason: HOSPADM

## 2025-05-04 RX ORDER — SODIUM CHLORIDE 0.9 % (FLUSH) 0.9 %
10 SYRINGE (ML) INJECTION EVERY 12 HOURS SCHEDULED
Status: DISCONTINUED | OUTPATIENT
Start: 2025-05-04 | End: 2025-05-05 | Stop reason: HOSPADM

## 2025-05-04 RX ORDER — FAMOTIDINE 20 MG/1
20 TABLET, FILM COATED ORAL 2 TIMES DAILY
Status: DISCONTINUED | OUTPATIENT
Start: 2025-05-04 | End: 2025-05-04

## 2025-05-04 RX ORDER — POTASSIUM CHLORIDE 7.45 MG/ML
10 INJECTION INTRAVENOUS PRN
Status: DISCONTINUED | OUTPATIENT
Start: 2025-05-04 | End: 2025-05-05 | Stop reason: HOSPADM

## 2025-05-04 RX ORDER — ACETAMINOPHEN 325 MG/1
650 TABLET ORAL EVERY 6 HOURS PRN
Status: DISCONTINUED | OUTPATIENT
Start: 2025-05-04 | End: 2025-05-05 | Stop reason: HOSPADM

## 2025-05-04 RX ORDER — MORPHINE SULFATE 2 MG/ML
2 INJECTION, SOLUTION INTRAMUSCULAR; INTRAVENOUS EVERY 4 HOURS PRN
Status: DISCONTINUED | OUTPATIENT
Start: 2025-05-04 | End: 2025-05-05

## 2025-05-04 RX ORDER — SODIUM CHLORIDE 9 MG/ML
INJECTION, SOLUTION INTRAVENOUS CONTINUOUS
Status: ACTIVE | OUTPATIENT
Start: 2025-05-04 | End: 2025-05-05

## 2025-05-04 RX ORDER — SODIUM CHLORIDE 9 MG/ML
INJECTION, SOLUTION INTRAVENOUS CONTINUOUS
Status: DISCONTINUED | OUTPATIENT
Start: 2025-05-04 | End: 2025-05-04

## 2025-05-04 RX ORDER — ACETAMINOPHEN 650 MG/1
650 SUPPOSITORY RECTAL EVERY 6 HOURS PRN
Status: DISCONTINUED | OUTPATIENT
Start: 2025-05-04 | End: 2025-05-05 | Stop reason: HOSPADM

## 2025-05-04 RX ORDER — SODIUM CHLORIDE 0.9 % (FLUSH) 0.9 %
10 SYRINGE (ML) INJECTION PRN
Status: DISCONTINUED | OUTPATIENT
Start: 2025-05-04 | End: 2025-05-05 | Stop reason: HOSPADM

## 2025-05-04 RX ORDER — ONDANSETRON 2 MG/ML
4 INJECTION INTRAMUSCULAR; INTRAVENOUS EVERY 6 HOURS PRN
Status: DISCONTINUED | OUTPATIENT
Start: 2025-05-04 | End: 2025-05-05 | Stop reason: HOSPADM

## 2025-05-04 RX ADMIN — SODIUM CHLORIDE: 0.9 INJECTION, SOLUTION INTRAVENOUS at 02:43

## 2025-05-04 RX ADMIN — ACETAMINOPHEN 650 MG: 650 SUPPOSITORY RECTAL at 09:43

## 2025-05-04 RX ADMIN — BENZOCAINE 6 MG-MENTHOL 10 MG LOZENGES 1 LOZENGE: at 20:10

## 2025-05-04 RX ADMIN — BENZOCAINE 6 MG-MENTHOL 10 MG LOZENGES 1 LOZENGE: at 14:53

## 2025-05-04 RX ADMIN — FAMOTIDINE 20 MG: 10 INJECTION, SOLUTION INTRAVENOUS at 09:27

## 2025-05-04 RX ADMIN — FAMOTIDINE 20 MG: 10 INJECTION, SOLUTION INTRAVENOUS at 20:11

## 2025-05-04 RX ADMIN — ACETAMINOPHEN 650 MG: 325 TABLET ORAL at 20:16

## 2025-05-04 RX ADMIN — SODIUM CHLORIDE: 0.9 INJECTION, SOLUTION INTRAVENOUS at 14:54

## 2025-05-04 RX ADMIN — BENZOCAINE, BUTAMBEN, AND TETRACAINE HYDROCHLORIDE 1 SPRAY: .028; .004; .004 AEROSOL, SPRAY TOPICAL at 01:28

## 2025-05-04 ASSESSMENT — PAIN DESCRIPTION - LOCATION
LOCATION: HEAD
LOCATION: HEAD
LOCATION: ABDOMEN
LOCATION: HEAD

## 2025-05-04 ASSESSMENT — PAIN DESCRIPTION - PAIN TYPE
TYPE: ACUTE PAIN

## 2025-05-04 ASSESSMENT — PAIN SCALES - GENERAL
PAINLEVEL_OUTOF10: 5
PAINLEVEL_OUTOF10: 3
PAINLEVEL_OUTOF10: 3
PAINLEVEL_OUTOF10: 0

## 2025-05-04 ASSESSMENT — PAIN DESCRIPTION - ORIENTATION
ORIENTATION: MID
ORIENTATION: MID;UPPER
ORIENTATION: MID

## 2025-05-04 ASSESSMENT — PAIN DESCRIPTION - ONSET
ONSET: ON-GOING

## 2025-05-04 ASSESSMENT — PAIN DESCRIPTION - FREQUENCY
FREQUENCY: CONTINUOUS

## 2025-05-04 ASSESSMENT — PAIN - FUNCTIONAL ASSESSMENT
PAIN_FUNCTIONAL_ASSESSMENT: ACTIVITIES ARE NOT PREVENTED

## 2025-05-04 ASSESSMENT — PAIN DESCRIPTION - DESCRIPTORS
DESCRIPTORS: DISCOMFORT
DESCRIPTORS: ACHING

## 2025-05-04 NOTE — ED NOTES
Pt reports she is feeling better at this time. States she still has slight abdominal tenderness. Denies further complaints.

## 2025-05-04 NOTE — CONSULTS
Partial SBO vs acute gastroenteritis. - symptoms seem to be resolving.  - Will try clamping NG and discontinue it if tolerated.

## 2025-05-04 NOTE — H&P
Ishmael Toure M.D.  Internal Medicine History and Phyisical    Patient: Lindsay Newton  Date of Admission: 5/3/2025 10:39 PM  Date of Evaluation: 5/4/2025      Subjective:  Chief Complaint:    Chief Complaint   Patient presents with    Vomiting     Pt states she ate chinese lastnight and has been vomiting since.        History Obtained From:  patient, electronic medical record  PCP: Manuel Monteiro MD      History of Present Illness:   Lindsay Newton is a 65 y.o. female who presented to the ER last evening forabdominal pain, nausea and vomiting .  She states she ate Chinese food for dinner Friday night and prior to bed felt abdominal pain and nausea.   Symptoms were worse upon awakening yesterday morning.  She was vomiting all day and unable to tolerate PO intake and presented to ER late last night.  She denies any fever or chills.  She denies any chest pain, palpitations or SOB.  In ER her labs were unremarkable.  CT abd revealed partial SBO with transition point in RLQ.  NGT was placed for decompression.  This morning she is feeling better.  Her abd pain improved with NGT placement.  She had a HA this AM and didn't want to take Morphine so she received a tylenol rectal suppository which helped resolve her HA..        Review of Systems:  Constitutional:negative  for fevers, and negative for chills.  Respiratory: negative for shortness of breath, negative for cough, and negative for wheezing  Cardiovascular: negative for chest pain, negative for palpitations, and negative for syncope  Gastrointestinal: positive for abdominal pain, positive for nausea,positive for vomiting, negative for diarrhea, negative for constipation, and negative for hematochezia or melena  Genitourinary: negative for dysuria, negative for urinary urgency, negative for urinary frequency, and negative for hematuria  Neurological: negative for unilateral weakness, numbness or tingling.    All other systems were reviewed with the

## 2025-05-04 NOTE — ED PROVIDER NOTES
[facility]  EMERGENCY DEPARTMENT ENCOUNTER      Pt Name: Lindsay Newton  MRN: [id number]  Birthdate 1960  Date of evaluation: 5/3/2025  Provider: Michele Islas MD    CHIEF COMPLAINT       Chief Complaint   Patient presents with    Vomiting     Pt states she ate chinese lastnight and has been vomiting since.      HISTORY OF PRESENT ILLNESS      Lindsay Newton is a 65 y.o. female who presents to the emergency department for evaluation of epigastric abdominal pain, nausea vomiting which began last night after eating Chinese food.  States that she fell at onset of stomach for going to bed however the symptoms intensified this morning upon waking.  She has been vomiting all day and has been unable to tolerate oral intake aside from toast this morning.  Did have 1 bowel movement today with, no diarrhea.  No blood in stool or vomit.  No back pain.  No chest pain.  No shortness of breath.  No fever.    Patient does have smoking history but quit when she was age 25.  Does have hyperlipidemia.  No hypertension or diabetes.  No personal history of MI or heart disease.    REVIEW OF SYSTEMS       Review of Systems   Constitutional:  Negative for fever.   HENT:  Negative for congestion.    Respiratory:  Negative for shortness of breath.    Cardiovascular:  Negative for chest pain.   Gastrointestinal:  Positive for abdominal pain, nausea and vomiting.   Musculoskeletal:  Negative for back pain.     PAST MEDICAL HISTORY     Past Medical History:   Diagnosis Date    Asthma 2017    High cholesterol     Hypothyroidism     Liver disease     Osteoporosis     Primary biliary cholangitis (HCC)     Thyroid disease        SURGICAL HISTORY       Past Surgical History:   Procedure Laterality Date    CARDIAC CATHETERIZATION Left 2017    Right Radial/[facility]/     SECTION      COLONOSCOPY  2013    FRACTURE SURGERY Right 1988    femur    HYSTEROSCOPY         CURRENT

## 2025-05-05 VITALS
BODY MASS INDEX: 25.27 KG/M2 | OXYGEN SATURATION: 94 % | WEIGHT: 148 LBS | HEART RATE: 69 BPM | RESPIRATION RATE: 18 BRPM | SYSTOLIC BLOOD PRESSURE: 111 MMHG | TEMPERATURE: 96.7 F | DIASTOLIC BLOOD PRESSURE: 73 MMHG | HEIGHT: 64 IN

## 2025-05-05 LAB
ANION GAP SERPL CALCULATED.3IONS-SCNC: 10 MMOL/L (ref 9–16)
BASOPHILS # BLD: 0.04 K/UL (ref 0–0.2)
BASOPHILS NFR BLD: 1 % (ref 0–2)
BUN SERPL-MCNC: 8 MG/DL (ref 8–23)
BUN/CREAT SERPL: 13 (ref 9–20)
CALCIUM SERPL-MCNC: 8.1 MG/DL (ref 8.6–10.4)
CHLORIDE SERPL-SCNC: 110 MMOL/L (ref 98–107)
CO2 SERPL-SCNC: 23 MMOL/L (ref 20–31)
CREAT SERPL-MCNC: 0.6 MG/DL (ref 0.5–0.9)
EOSINOPHIL # BLD: 0.13 K/UL (ref 0–0.44)
EOSINOPHILS RELATIVE PERCENT: 2 % (ref 1–4)
ERYTHROCYTE [DISTWIDTH] IN BLOOD BY AUTOMATED COUNT: 13 % (ref 11.8–14.4)
GFR, ESTIMATED: >90 ML/MIN/1.73M2
GLUCOSE SERPL-MCNC: 80 MG/DL (ref 74–99)
HCT VFR BLD AUTO: 36.8 % (ref 36.3–47.1)
HGB BLD-MCNC: 11.8 G/DL (ref 11.9–15.1)
IMM GRANULOCYTES # BLD AUTO: <0.03 K/UL (ref 0–0.3)
IMM GRANULOCYTES NFR BLD: 0 %
LYMPHOCYTES NFR BLD: 1.62 K/UL (ref 1.1–3.7)
LYMPHOCYTES RELATIVE PERCENT: 20 % (ref 24–43)
MCH RBC QN AUTO: 28.4 PG (ref 25.2–33.5)
MCHC RBC AUTO-ENTMCNC: 32.1 G/DL (ref 28.4–34.8)
MCV RBC AUTO: 88.7 FL (ref 82.6–102.9)
MONOCYTES NFR BLD: 0.6 K/UL (ref 0.1–1.2)
MONOCYTES NFR BLD: 7 % (ref 3–12)
NEUTROPHILS NFR BLD: 70 % (ref 36–65)
NEUTS SEG NFR BLD: 5.79 K/UL (ref 1.5–8.1)
NRBC BLD-RTO: 0 PER 100 WBC
PLATELET # BLD AUTO: 267 K/UL (ref 138–453)
PMV BLD AUTO: 8.4 FL (ref 8.1–13.5)
POTASSIUM SERPL-SCNC: 3.8 MMOL/L (ref 3.7–5.3)
RBC # BLD AUTO: 4.15 M/UL (ref 3.95–5.11)
SODIUM SERPL-SCNC: 143 MMOL/L (ref 136–145)
WBC OTHER # BLD: 8.2 K/UL (ref 3.5–11.3)

## 2025-05-05 PROCEDURE — 36415 COLL VENOUS BLD VENIPUNCTURE: CPT

## 2025-05-05 PROCEDURE — 2500000003 HC RX 250 WO HCPCS: Performed by: INTERNAL MEDICINE

## 2025-05-05 PROCEDURE — 80048 BASIC METABOLIC PNL TOTAL CA: CPT

## 2025-05-05 PROCEDURE — 85025 COMPLETE CBC W/AUTO DIFF WBC: CPT

## 2025-05-05 RX ADMIN — SODIUM CHLORIDE, PRESERVATIVE FREE 10 ML: 5 INJECTION INTRAVENOUS at 10:00

## 2025-05-05 NOTE — PLAN OF CARE
Problem: Discharge Planning  Goal: Discharge to home or other facility with appropriate resources  5/5/2025 1204 by Ana Maria Lr RN  Outcome: Adequate for Discharge  5/5/2025 0701 by Ana Maria Lr RN  Outcome: Progressing  Flowsheets (Taken 5/4/2025 1945 by Jemima Roger, RN)  Discharge to home or other facility with appropriate resources: Identify barriers to discharge with patient and caregiver     Problem: Pain  Goal: Verbalizes/displays adequate comfort level or baseline comfort level  5/5/2025 1204 by Ana Maria Lr RN  Outcome: Adequate for Discharge  5/5/2025 0701 by Ana Maria Lr RN  Outcome: Progressing  Flowsheets (Taken 5/4/2025 1945 by Jemima Roger, RN)  Verbalizes/displays adequate comfort level or baseline comfort level: Encourage patient to monitor pain and request assistance     Problem: Safety - Adult  Goal: Free from fall injury  5/5/2025 1204 by Ana Maria Lr RN  Outcome: Adequate for Discharge  5/5/2025 0701 by Ana Maria Lr RN  Outcome: Progressing  Flowsheets (Taken 5/5/2025 0532 by Jemima Roger, RN)  Free From Fall Injury: Instruct family/caregiver on patient safety     Problem: Nutrition Deficit:  Goal: Optimize nutritional status  5/5/2025 1204 by Ana Maria Lr RN  Outcome: Adequate for Discharge  5/5/2025 1001 by Patricia Meehan  Outcome: Progressing  Flowsheets (Taken 5/5/2025 1001)  Nutrient intake appropriate for improving, restoring, or maintaining nutritional needs: Monitor oral intake, labs, and treatment plans  Note: Nutrition Problem #1: Predicted inadequate energy intake  Intervention: Food and/or Nutrient Delivery: Continue Current Diet

## 2025-05-05 NOTE — DISCHARGE SUMMARY
Discharge Summary    Lindsay Newton  :  1960  MRN:  [id number]    Admit date:  5/3/2025      Discharge date: 2025     Admitting Physician:  Kevin Santiago MD    Discharge Diagnoses:    Principal Problem:    Partial small bowel obstruction (HCC)  Resolved Problems:    * No resolved hospital problems. *      Hospital Course:   Lindsay Newton is a 65 y.o. female admitted with partial small bowel obstruction.  She presented with abdominal pain, nausea and vomiting.  Patient reported she ate Chinese food for dinner Friday night prior to bed felt abdominal pain and nausea.  Symptoms continued to worsen in the morning.  She stated she was unable to tolerate p.o. intake and presented to the emergency room.  She denied fever or chills.  She denied chest pain palpitations or shortness of breath.  Labs were unremarkable during her evaluation.  CT abdomen and pelvis revealed partial small bowel obstruction with transition point in right lower quadrant.  NG was placed for decompression.  Following morning patient was better pain was improved and NG was removed.  Patient complained of headache.  Patient is currently passing stools as well as flatus.  She is tolerating diet.  Abdominal pain is resolved.  Plan will be to discharge today she will follow-up with primary care.    Consultants:  none    Procedures: none    Complications: none    Discharge Condition: fair    Exam:  GEN:    Awake, alert and oriented x3.   EYES:   EOMI, pupils equal   NECK: Supple. No lymphadenopathy.  No carotid bruit  CVS:     regular rate and rhythm, no audible murmur  PULM:  CTA, no wheezes, rales or rhonchi, no acute respiratory distress  ABD:     Bowels sounds normal.  Abdomen is soft.  No distention.  no tenderness to palpation.   EXT:     no edema bilaterally .  No calf tenderness.   NEURO: Moves all extremities.  Motor and sensory are grossly intact  SKIN:    No rashes.  No skin lesions.      Significant Diagnostic Studies:   Lab

## 2025-05-05 NOTE — CARE COORDINATION
Case Management Assessment  Initial Evaluation    Date/Time of Evaluation: 5/5/2025 11:14 AM  Assessment Completed by: ARLINE Earl    If patient is discharged prior to next notation, then this note serves as note for discharge by case management.    Patient Name: Lindsay Newton                   YOB: 1960  Diagnosis: Small bowel obstruction (HCC) [K56.609]  Partial small bowel obstruction (HCC) [K56.600]                   Date / Time: 5/3/2025 10:39 PM    Patient Admission Status: Inpatient   Readmission Risk (Low < 19, Mod (19-27), High > 27): Readmission Risk Score: 5.7    Current PCP: Manuel Monteiro MD  PCP verified by CM? Yes    Chart Reviewed: Yes      History Provided by: Patient, Medical Record  Patient Orientation: Alert and Oriented, Person, Place, Situation, Self    Patient Cognition: Alert    Hospitalization in the last 30 days (Readmission):  No    If yes, Readmission Assessment in  Navigator will be completed.    Advance Directives:      Code Status: Full Code   Patient's Primary Decision Maker is: Legal Next of Kin (Reports that she has Healthcare POA;  Encouraged her to provide a copy to PCP at next scheduled visit;)    Primary Decision Maker: Roque Newton S - Spouse - [phone]    Discharge Planning:    Patient lives with: Spouse/Significant Other Type of Home: House  Primary Care Giver: Self  Patient Support Systems include: Spouse/Significant Other, Family Members, [place], Friends/Neighbors   Current Financial resources: Medicare  Current community resources: None  Current services prior to admission:  (Has DME but does not need to use anything at this time;)            Current DME:              Type of Home Care services:  None    ADLS  Prior functional level: Independent in ADLs/IADLs  Current functional level: Independent in ADLs/IADLs    PT AM-PAC:   /24  OT AM-PAC:   /24    Family can provide assistance at DC: Yes  Would you like

## 2025-05-05 NOTE — PROGRESS NOTES
Assessment completed. Pt denies abdominal pain or nausea. No diarrhea. Tolerating diet. Pt requests to shower after breakfast. Encouraged to ambulate in hallways today.   
Comprehensive Nutrition Assessment    Type and Reason for Visit:  Initial    Nutrition Recommendations/Plan:   Meet >75% of pt needs.  Consume >75% of pt meals.   Education on finely chewing meals and avoidance of GI irritants.      Malnutrition Assessment:  Malnutrition Status:  No malnutrition (05/05/25 0958)    Context:  Acute Illness     Findings of the 6 clinical characteristics of malnutrition:  Energy Intake:  Mild decrease in energy intake  Weight Loss:  Mild weight loss     Body Fat Loss:  No body fat loss     Muscle Mass Loss:  No muscle mass loss    Fluid Accumulation:  No fluid accumulation     Strength:  Not Performed    Nutrition Assessment:    Predicted inadequate energy intake r/t altered GI function aeb nausea and vomiting. Pt reported since being admitted into the hospital her appetite has increased feeling to discomfort or abdominal pain. Discussed with pt the importance of thoroughly chewing their food and to avoid GI irritants. Labs reviewed with elevated Cl (110) and decreased calcium (8.1).    Nutrition Related Findings:    Active bowel sounds and no edema. Wound Type: None       Current Nutrition Intake & Therapies:    Average Meal Intake: %  Average Supplements Intake: None Ordered  ADULT DIET; Regular    Anthropometric Measures:  Height: 162.6 cm (5' 4.02\")  Ideal Body Weight (IBW): 120 lbs (55 kg)    Admission Body Weight: 67.3 kg (148 lb 5.9 oz)  Current Body Weight: 67.1 kg (147 lb 14.9 oz), 123.3 % IBW. Weight Source: Bed scale  Current BMI (kg/m2): 25.4  Usual Body Weight: 68.6 kg (151 lb 3.8 oz) (4 months ago)     % Weight Change (Calculated): -2.2  Weight Adjustment For: No Adjustment                 BMI Categories: Overweight (BMI 25.0-29.9)  Hematology:  Recent Labs     05/03/25 2245 05/04/25 0655 05/05/25 0538   WBC 10.2 7.5 8.2   HGB 13.2 11.6* 11.8*   HCT 39.7 35.2* 36.8     Chemistry:  Recent Labs     05/03/25 2245 05/04/25 0655 05/05/25 0538    140 143 
Patient brought to room 320 from ER and report given by JACINTO Bañuelos. Writer completed admission navigator with patient. Home medications reviewed with patient. Patient is resting comfortably in bed at this time with  at bedside. No signs of respiratory distress noted, denies SOB and chest pain. Patient denies any abdominal tenderness, nausea, and vomiting at this time. Patient has NG tube placed in right nare measuring at 55 cm. Patient denies any other concerns at this time, call light within reach, personal items within reach, bed locked,  socks applied.  
Pt offered a rectal suppository for her headache but she declines at this time - states \"it's getting better and she'd rather hold off for now.\" Care ongoing, call light within reach.  
Pt provided discharge instructions verbally and in writing. Pt has no new medications. Pt was provided a handout on bowel obstructions and voiced no questions.  at bedside as well. Pt provided a follow up appt with Dr. Monteiro on May 7th. Pt has already rescheduled the time due to a schedule conflict.   
Pt requested the rectal suppository Tylenol for her 6/10 headache - see MAR for administration. Care ongoing, call light within reach.  
Pt resting comfortably in bed with eyes closed at this time. Respirations even and unlabored, no distress noted. Care ongoing.  
Vitals and assessment completed at this time, see flowsheet for more details. Pt resting in bed awake with family at bedside. Pt A&Ox4, on RA. Pt denies any pain at this time, denies any nausea. Pt eating meal tray at this time. All needs met, call light within reach. Care ongoing.  
Writer to bedside to check residual after the NG has been clamped for 4 hours - less than 5 mL - NG removed per Dr. Laws's order of less than 100 mL residual; also ordered clear liquid diet per Dr. Laws's order. Care ongoing, call light within reach.  
Writer to bedside to complete morning assessment. Upon entry to room, pt awake and lying in bed , respirations even and unlabored while on room air. Vitals obtained and assessment completed, see flow sheet for details. Pt denies needs from writer at this time. Call light in reach. Care ongoing.    
patient's Advance Care Plan is NOT present because:    []  I confirmed today that the patient does not wish or was not able to name a   surrogate decision maker or provide and advance care plan.    [] Hospice care is currently being provided or has been provided within the   calendar year.    []  I did NOT confirm today the presence of an Advance Care Plan or surrogate   decision maker documented within the patient's medical record.   [DOES NOT SATISFY [facility] PERFORMANCE]    CORI Myers - CNP , CORI, NP-C  [facility]        5/5/2025, 6:40 AM

## 2025-05-05 NOTE — PLAN OF CARE
Problem: Discharge Planning  Goal: Discharge to home or other facility with appropriate resources  Outcome: Progressing  Flowsheets (Taken 5/4/2025 1945 by Jemima Roger, RN)  Discharge to home or other facility with appropriate resources: Identify barriers to discharge with patient and caregiver     Problem: Pain  Goal: Verbalizes/displays adequate comfort level or baseline comfort level  Outcome: Progressing  Flowsheets (Taken 5/4/2025 1945 by Jemima Roger, RN)  Verbalizes/displays adequate comfort level or baseline comfort level: Encourage patient to monitor pain and request assistance     Problem: Safety - Adult  Goal: Free from fall injury  Outcome: Progressing  Flowsheets (Taken 5/5/2025 0532 by Jemima Roger, RN)  Free From Fall Injury: Instruct family/caregiver on patient safety

## 2025-05-06 ENCOUNTER — TELEPHONE (OUTPATIENT)
Dept: PRIMARY CARE CLINIC | Age: 65
End: 2025-05-06

## 2025-05-06 NOTE — TELEPHONE ENCOUNTER
Care Transitions Initial Follow Up Call    Outreach made within 2 business days of discharge: Yes    Patient: Lindsay Newton Patient : 1960   MRN: [id number]  Reason for Admission: Partial small bowel obstruction   Discharge Date: 25         Dis[facility] department[facility]facility: [facility]    1st call left voicemail    Scheduled appointment with PCP within 7-14 days    Follow Up  Future Appointments   Date Time Provider Department Center   2025  9:30 AM Manuel Monteiro MD [facility] DEP   9/10/2025  8:00 AM Manuel Monteiro MD [facility] DEP   1/15/2026  9:15 AM Tori Monk APRN - CNM [facility] TPP   3/11/2026  8:00 AM Manuel Monteiro MD [facility] DEP       CATRACHITA BAIN MA

## 2025-05-07 ENCOUNTER — OFFICE VISIT (OUTPATIENT)
Dept: PRIMARY CARE CLINIC | Age: 65
End: 2025-05-07

## 2025-05-07 VITALS
SYSTOLIC BLOOD PRESSURE: 110 MMHG | OXYGEN SATURATION: 98 % | BODY MASS INDEX: 25.05 KG/M2 | HEART RATE: 70 BPM | DIASTOLIC BLOOD PRESSURE: 74 MMHG | WEIGHT: 146 LBS

## 2025-05-07 DIAGNOSIS — E78.2 MIXED HYPERLIPIDEMIA: ICD-10-CM

## 2025-05-07 DIAGNOSIS — E03.9 ACQUIRED HYPOTHYROIDISM: ICD-10-CM

## 2025-05-07 DIAGNOSIS — K56.609 SBO (SMALL BOWEL OBSTRUCTION) (HCC): Primary | ICD-10-CM

## 2025-05-07 DIAGNOSIS — M81.0 OSTEOPOROSIS, UNSPECIFIED OSTEOPOROSIS TYPE, UNSPECIFIED PATHOLOGICAL FRACTURE PRESENCE: ICD-10-CM

## 2025-05-07 DIAGNOSIS — K74.3 PRIMARY BILIARY CHOLANGITIS (HCC): ICD-10-CM

## 2025-05-07 DIAGNOSIS — Z09 HOSPITAL DISCHARGE FOLLOW-UP: ICD-10-CM

## 2025-05-07 RX ORDER — LEVOTHYROXINE SODIUM 150 UG/1
TABLET ORAL
Qty: 90 TABLET | Refills: 1 | Status: SHIPPED | OUTPATIENT
Start: 2025-05-07

## 2025-05-07 SDOH — ECONOMIC STABILITY: FOOD INSECURITY: WITHIN THE PAST 12 MONTHS, THE FOOD YOU BOUGHT JUST DIDN'T LAST AND YOU DIDN'T HAVE MONEY TO GET MORE.: NEVER TRUE

## 2025-05-07 SDOH — ECONOMIC STABILITY: FOOD INSECURITY: WITHIN THE PAST 12 MONTHS, YOU WORRIED THAT YOUR FOOD WOULD RUN OUT BEFORE YOU GOT MONEY TO BUY MORE.: NEVER TRUE

## 2025-05-07 NOTE — TELEPHONE ENCOUNTER
Care Transitions Initial Follow Up Call    Outreach made within 2 business days of discharge: Yes    Patient: Lindsay Newton Patient : 1960   MRN: [id number]  Reason for Admission: Partial small bowel obstruction   Discharge Date: 25       Spoke with: Marianna    Discharge department/facility: [facility] Interactive Patient Contact:  Was patient able to fill all prescriptions: No: none given  Was patient instructed to bring all medications to the follow-up visit: No: none given  Is patient taking all medications as directed in the discharge summary? Yes  Does patient understand their discharge instructions: Yes  Does patient have questions or concerns that need addressed prior to 7-14 day follow up office visit: no    Additional needs identified to be addressed with provider  No needs identified             Scheduled appointment with PCP within 7-14 days    Follow Up  Future Appointments   Date Time Provider Department Center   2025  9:30 AM Manuel Monteiro MD [facility] DEP   9/10/2025  8:00 AM Manuel Monteiro MD [facility] DEP   1/15/2026  9:15 AM Tori Monk APRN - CNM [facility] TPP   3/11/2026  8:00 AM Manuel Monteiro MD [facility] DEP       CATRACHITA BAIN MA

## 2025-05-07 NOTE — PROGRESS NOTES
Post-Discharge Transitional Care  Follow Up      Lindsay Newton   YOB: 1960    Date of Office Visit:  5/7/2025  Date of Hospital Admission: 5/3/25  Date of Hospital Discharge: 5/5/25  Risk of hospital readmission (high >=14%. Medium >=10%) :Readmission Risk Score: 5.7      Care management risk score Rising risk (score 2-5) and Complex Care (Scores >=6): No Risk Score On File     Non face to face  following discharge, date last encounter closed (first attempt may have been earlier): 05/06/2025    Call initiated 2 business days of discharge: Yes    ASSESSMENT/PLAN:   Mixed hyperlipidemia  Primary biliary cholangitis (HCC)  Acquired hypothyroidism  Osteoporosis, unspecified osteoporosis type, unspecified pathological fracture presence  Hospital discharge follow-up  -     [place] DISCHARGE MEDS RECONCILED W/ CURRENT OUTPATIENT MED LIST  SBO (small bowel obstruction) ([facility])      Medical Decision Making: moderate complexity  Return in about 6 months (around 11/7/2025) for Follow up medications.         Assessment & Plan  Small Bowel Obstruction.  Hospitalized from 05/03/2025 to 05/05/2025 due to abdominal pain, nausea, and vomiting, leading to the diagnosis of a partial small bowel obstruction. Nasogastric tube placement resulted in significant symptomatic improvement. Currently asymptomatic, has resumed normal diet and activity levels, and advised to maintain a high-fiber diet and ensure regular bowel movements. Immediate emergency room evaluation recommended if similar symptoms occur in the future due to the potential severity of small bowel obstructions.    Primary Biliary Cholangitis.  Currently taking ursodiol for primary biliary cholangitis, with recent lab results reported as great. Had an appointment last month with her specialist. Continue current medication regimen.    Osteoporosis  - Optimize vitamin D, continue Fosamax.  Follow-up with GI.    Hypothyroidism  - Stable at this time, continue

## 2025-06-16 ENCOUNTER — HOSPITAL ENCOUNTER (OUTPATIENT)
Age: 65
Discharge: HOME | End: 2025-06-16
Payer: MEDICARE

## 2025-06-16 ENCOUNTER — OFFICE VISIT (OUTPATIENT)
Dept: PRIMARY CARE CLINIC | Age: 65
End: 2025-06-16
Payer: MEDICARE

## 2025-06-16 VITALS
BODY MASS INDEX: 24.19 KG/M2 | SYSTOLIC BLOOD PRESSURE: 128 MMHG | DIASTOLIC BLOOD PRESSURE: 88 MMHG | HEART RATE: 81 BPM | OXYGEN SATURATION: 97 % | WEIGHT: 141 LBS

## 2025-06-16 DIAGNOSIS — R10.9 ABDOMINAL PAIN, UNSPECIFIED ABDOMINAL LOCATION: ICD-10-CM

## 2025-06-16 DIAGNOSIS — K74.3 PRIMARY BILIARY CHOLANGITIS (HCC): ICD-10-CM

## 2025-06-16 DIAGNOSIS — R10.9 ABDOMINAL PAIN, UNSPECIFIED ABDOMINAL LOCATION: Primary | ICD-10-CM

## 2025-06-16 DIAGNOSIS — E03.9 ACQUIRED HYPOTHYROIDISM: ICD-10-CM

## 2025-06-16 LAB
ANION GAP SERPL CALCULATED.3IONS-SCNC: 12 MMOL/L (ref 9–16)
BASOPHILS # BLD: 0.05 K/UL (ref 0–0.2)
BASOPHILS NFR BLD: 1 % (ref 0–2)
BILIRUBIN, POC: 0
BLOOD URINE, POC: NORMAL
BUN SERPL-MCNC: 15 MG/DL (ref 8–23)
BUN/CREAT SERPL: 19 (ref 9–20)
CALCIUM SERPL-MCNC: 9.2 MG/DL (ref 8.6–10.4)
CHLORIDE SERPL-SCNC: 103 MMOL/L (ref 98–107)
CLARITY, POC: CLEAR
CO2 SERPL-SCNC: 25 MMOL/L (ref 20–31)
COLOR, POC: YELLOW
CREAT SERPL-MCNC: 0.8 MG/DL (ref 0.5–0.9)
EOSINOPHIL # BLD: 0.07 K/UL (ref 0–0.44)
EOSINOPHILS RELATIVE PERCENT: 1 % (ref 1–4)
ERYTHROCYTE [DISTWIDTH] IN BLOOD BY AUTOMATED COUNT: 13.1 % (ref 11.8–14.4)
GFR, ESTIMATED: 85 ML/MIN/1.73M2
GLUCOSE SERPL-MCNC: 89 MG/DL (ref 74–99)
GLUCOSE URINE, POC: NORMAL MG/DL
HCT VFR BLD AUTO: 37.9 % (ref 36.3–47.1)
HGB BLD-MCNC: 12.6 G/DL (ref 11.9–15.1)
IMM GRANULOCYTES # BLD AUTO: <0.03 K/UL (ref 0–0.3)
IMM GRANULOCYTES NFR BLD: 0 %
KETONES, POC: NORMAL MG/DL
LEUKOCYTE EST, POC: NORMAL
LIPASE SERPL-CCNC: 37 U/L (ref 13–60)
LYMPHOCYTES NFR BLD: 2.03 K/UL (ref 1.1–3.7)
LYMPHOCYTES RELATIVE PERCENT: 31 % (ref 24–43)
MCH RBC QN AUTO: 29.4 PG (ref 25.2–33.5)
MCHC RBC AUTO-ENTMCNC: 33.2 G/DL (ref 28.4–34.8)
MCV RBC AUTO: 88.6 FL (ref 82.6–102.9)
MONOCYTES NFR BLD: 0.58 K/UL (ref 0.1–1.2)
MONOCYTES NFR BLD: 9 % (ref 3–12)
NEUTROPHILS NFR BLD: 58 % (ref 36–65)
NEUTS SEG NFR BLD: 3.75 K/UL (ref 1.5–8.1)
NITRITE, POC: NORMAL
NRBC BLD-RTO: 0 PER 100 WBC
PH, POC: 5
PLATELET # BLD AUTO: 324 K/UL (ref 138–453)
PMV BLD AUTO: 8.3 FL (ref 8.1–13.5)
POTASSIUM SERPL-SCNC: 3.6 MMOL/L (ref 3.7–5.3)
PROTEIN, POC: NORMAL MG/DL
RBC # BLD AUTO: 4.28 M/UL (ref 3.95–5.11)
SODIUM SERPL-SCNC: 140 MMOL/L (ref 136–145)
SPECIFIC GRAVITY, POC: 1.01
UROBILINOGEN, POC: 0.2 MG/DL
WBC OTHER # BLD: 6.5 K/UL (ref 3.5–11.3)

## 2025-06-16 PROCEDURE — G8420 CALC BMI NORM PARAMETERS: HCPCS | Performed by: FAMILY MEDICINE

## 2025-06-16 PROCEDURE — G8400 PT W/DXA NO RESULTS DOC: HCPCS | Performed by: FAMILY MEDICINE

## 2025-06-16 PROCEDURE — 36415 COLL VENOUS BLD VENIPUNCTURE: CPT

## 2025-06-16 PROCEDURE — 83690 ASSAY OF LIPASE: CPT

## 2025-06-16 PROCEDURE — 87086 URINE CULTURE/COLONY COUNT: CPT

## 2025-06-16 PROCEDURE — 1090F PRES/ABSN URINE INCON ASSESS: CPT | Performed by: FAMILY MEDICINE

## 2025-06-16 PROCEDURE — 80048 BASIC METABOLIC PNL TOTAL CA: CPT

## 2025-06-16 PROCEDURE — 99214 OFFICE O/P EST MOD 30 MIN: CPT | Performed by: FAMILY MEDICINE

## 2025-06-16 PROCEDURE — G2211 COMPLEX E/M VISIT ADD ON: HCPCS | Performed by: FAMILY MEDICINE

## 2025-06-16 PROCEDURE — PBSHW POCT URINALYSIS DIPSTICK: Performed by: FAMILY MEDICINE

## 2025-06-16 PROCEDURE — 1036F TOBACCO NON-USER: CPT | Performed by: FAMILY MEDICINE

## 2025-06-16 PROCEDURE — 1123F ACP DISCUSS/DSCN MKR DOCD: CPT | Performed by: FAMILY MEDICINE

## 2025-06-16 PROCEDURE — 99211 OFF/OP EST MAY X REQ PHY/QHP: CPT | Performed by: FAMILY MEDICINE

## 2025-06-16 PROCEDURE — 3017F COLORECTAL CA SCREEN DOC REV: CPT | Performed by: FAMILY MEDICINE

## 2025-06-16 PROCEDURE — 81002 URINALYSIS NONAUTO W/O SCOPE: CPT | Performed by: FAMILY MEDICINE

## 2025-06-16 PROCEDURE — G8427 DOCREV CUR MEDS BY ELIG CLIN: HCPCS | Performed by: FAMILY MEDICINE

## 2025-06-16 PROCEDURE — 85025 COMPLETE CBC W/AUTO DIFF WBC: CPT

## 2025-06-16 SDOH — ECONOMIC STABILITY: FOOD INSECURITY: WITHIN THE PAST 12 MONTHS, THE FOOD YOU BOUGHT JUST DIDN'T LAST AND YOU DIDN'T HAVE MONEY TO GET MORE.: NEVER TRUE

## 2025-06-16 SDOH — ECONOMIC STABILITY: FOOD INSECURITY: WITHIN THE PAST 12 MONTHS, YOU WORRIED THAT YOUR FOOD WOULD RUN OUT BEFORE YOU GOT MONEY TO BUY MORE.: NEVER TRUE

## 2025-06-16 ASSESSMENT — PATIENT HEALTH QUESTIONNAIRE - PHQ9
SUM OF ALL RESPONSES TO PHQ QUESTIONS 1-9: 0
SUM OF ALL RESPONSES TO PHQ QUESTIONS 1-9: 0
2. FEELING DOWN, DEPRESSED OR HOPELESS: NOT AT ALL
1. LITTLE INTEREST OR PLEASURE IN DOING THINGS: NOT AT ALL
SUM OF ALL RESPONSES TO PHQ QUESTIONS 1-9: 0
SUM OF ALL RESPONSES TO PHQ QUESTIONS 1-9: 0

## 2025-06-16 NOTE — PROGRESS NOTES
[facility]      Patient:  Lindsay Newton 65 y.o. female     Date of Service: 06/16/25        Chief Complaint:   Chief Complaint   Patient presents with    Abdominal Pain     Pressure lower abdominal at first now goes into lower back.   Started- last Friday night.         History of Present Illness     History of Present Illness  The patient presents for evaluation of abdominal pain.    She began experiencing lower abdominal pressure on Friday night, which has since radiated to her hips and back. The intensity of the pressure has decreased, but it remains present. She reports no recent injuries or trauma to the abdomen. She recalls a similar episode of pain in the distant past. She does not experience any burning sensation during urination, blood in the urine, black or bloody stools, pale or greasy stools, or constipation. She maintains a regular bowel movement schedule with the aid of MiraLAX every morning. She also reports no nausea, vomiting, or pain in the buttock region. She has no history of back problems. She has been engaging in yard work, including planting trees and digging, which is more strenuous than her usual activities. She maintains a daily walking routine of at least 2 miles and engages in cardio exercises twice a week, along with some weightlifting. She tolerates all foods well and drinks a lot of water. She still has her gallbladder, uterus, and ovaries.    She is currently on Fosamax, Synthroid, Singulair, and ursodiol for her primary biliary cirrhosis (PBC). She is tolerating her thyroid medication well.        Allergies:    Patient has no known allergies.    Medication List:    Current Outpatient Medications   Medication Sig Dispense Refill    levothyroxine (SYNTHROID) 150 MCG tablet Pill 6 days a week (daily except skip Thursdays ) 90 tablet 1    alendronate (FOSAMAX) 70 MG tablet Take 1 tablet by mouth Once a week at 5 PM 4 tablet 3    montelukast (SINGULAIR) 10 MG

## 2025-06-17 LAB
MICROORGANISM SPEC CULT: NO GROWTH
SERVICE CMNT-IMP: NORMAL
SPECIMEN DESCRIPTION: NORMAL

## 2025-06-18 ENCOUNTER — RESULTS FOLLOW-UP (OUTPATIENT)
Dept: PRIMARY CARE CLINIC | Age: 65
End: 2025-06-18

## 2025-07-02 RX ORDER — ALENDRONATE SODIUM 70 MG/1
70 TABLET ORAL WEEKLY
Qty: 4 TABLET | Refills: 3 | Status: SHIPPED | OUTPATIENT
Start: 2025-07-02

## 2025-07-16 ENCOUNTER — OFFICE VISIT (OUTPATIENT)
Dept: PRIMARY CARE CLINIC | Age: 65
End: 2025-07-16
Payer: MEDICARE

## 2025-07-16 VITALS
OXYGEN SATURATION: 99 % | SYSTOLIC BLOOD PRESSURE: 116 MMHG | DIASTOLIC BLOOD PRESSURE: 72 MMHG | BODY MASS INDEX: 25.25 KG/M2 | WEIGHT: 147.2 LBS | HEART RATE: 70 BPM

## 2025-07-16 DIAGNOSIS — E78.2 MIXED HYPERLIPIDEMIA: ICD-10-CM

## 2025-07-16 DIAGNOSIS — R10.9 ABDOMINAL PAIN, UNSPECIFIED ABDOMINAL LOCATION: ICD-10-CM

## 2025-07-16 DIAGNOSIS — K74.3 PRIMARY BILIARY CHOLANGITIS (HCC): ICD-10-CM

## 2025-07-16 DIAGNOSIS — E03.9 ACQUIRED HYPOTHYROIDISM: Primary | ICD-10-CM

## 2025-07-16 PROCEDURE — 1090F PRES/ABSN URINE INCON ASSESS: CPT | Performed by: FAMILY MEDICINE

## 2025-07-16 PROCEDURE — G8427 DOCREV CUR MEDS BY ELIG CLIN: HCPCS | Performed by: FAMILY MEDICINE

## 2025-07-16 PROCEDURE — 1123F ACP DISCUSS/DSCN MKR DOCD: CPT | Performed by: FAMILY MEDICINE

## 2025-07-16 PROCEDURE — G8400 PT W/DXA NO RESULTS DOC: HCPCS | Performed by: FAMILY MEDICINE

## 2025-07-16 PROCEDURE — 99214 OFFICE O/P EST MOD 30 MIN: CPT | Performed by: FAMILY MEDICINE

## 2025-07-16 PROCEDURE — 1036F TOBACCO NON-USER: CPT | Performed by: FAMILY MEDICINE

## 2025-07-16 PROCEDURE — G8419 CALC BMI OUT NRM PARAM NOF/U: HCPCS | Performed by: FAMILY MEDICINE

## 2025-07-16 PROCEDURE — 3017F COLORECTAL CA SCREEN DOC REV: CPT | Performed by: FAMILY MEDICINE

## 2025-07-16 PROCEDURE — 99211 OFF/OP EST MAY X REQ PHY/QHP: CPT | Performed by: FAMILY MEDICINE

## 2025-07-16 PROCEDURE — G2211 COMPLEX E/M VISIT ADD ON: HCPCS | Performed by: FAMILY MEDICINE

## 2025-07-16 ASSESSMENT — PATIENT HEALTH QUESTIONNAIRE - PHQ9
SUM OF ALL RESPONSES TO PHQ QUESTIONS 1-9: 0
SUM OF ALL RESPONSES TO PHQ QUESTIONS 1-9: 0
1. LITTLE INTEREST OR PLEASURE IN DOING THINGS: NOT AT ALL
2. FEELING DOWN, DEPRESSED OR HOPELESS: NOT AT ALL
SUM OF ALL RESPONSES TO PHQ QUESTIONS 1-9: 0
SUM OF ALL RESPONSES TO PHQ QUESTIONS 1-9: 0

## 2025-07-16 NOTE — PROGRESS NOTES
[facility]      Patient:  Lindsay Newton 65 y.o. female     Date of Service: 07/16/25        Chief Complaint:   Chief Complaint   Patient presents with    Abdominal Pain     Patient states it went away with the exercises Dr Monteiro told her to do. The hip stretches have helped tremendously.         History of Present Illness     History of Present Illness  The patient presents for fu of abdominal pain.    The abdominal pain was previously evaluated and felt to be related more to a musculoskeletal origin.  She has since instituted regular physical therapy measures and maneuvers and exercises of the hip and abdominal muscles.  Pain is resolved at this time.        Allergies:    Patient has no known allergies.    Medication List:    Current Outpatient Medications   Medication Sig Dispense Refill    alendronate (FOSAMAX) 70 MG tablet Take 1 tablet by mouth Once a week at 5 PM 4 tablet 3    levothyroxine (SYNTHROID) 150 MCG tablet Pill 6 days a week (daily except skip Thursdays ) 90 tablet 1    montelukast (SINGULAIR) 10 MG tablet Take 1 tablet by mouth nightly 90 tablet 3    Cholecalciferol (VITAMIN D3) 50 MCG (2000 UT) TABS Take by mouth daily      ursodiol (ACTIGALL) 500 MG tablet Take 1 tablet by mouth 2 times daily      Ascorbic Acid (VITAMIN C) 250 MG tablet Take 1 tablet by mouth daily      Omega-3 Fatty Acids (FISH OIL) 1000 MG CAPS Take 1 capsule by mouth daily       No current facility-administered medications for this visit.          Review of Systems   See hpi  Physical Exam   Physical Exam  Constitutional:       Appearance: Well-developed.   HENT:      Head: Normocephalic.      Right Ear: External ear normal.      Left Ear: External ear normal.   Physical Exam  NAD  RRR  CTAB  No edema      Vitals:    07/16/25 0955   BP: 116/72   Pulse: 70   SpO2: 99%         Assessment and Plan     Assessment & Plan  Abdominal pain.  In the setting of musculoskeletal issue, likely strain, spasm.